# Patient Record
Sex: MALE | Race: BLACK OR AFRICAN AMERICAN | NOT HISPANIC OR LATINO | Employment: FULL TIME | URBAN - METROPOLITAN AREA
[De-identification: names, ages, dates, MRNs, and addresses within clinical notes are randomized per-mention and may not be internally consistent; named-entity substitution may affect disease eponyms.]

---

## 2018-09-26 ENCOUNTER — OFFICE VISIT (OUTPATIENT)
Dept: INTERNAL MEDICINE | Facility: CLINIC | Age: 58
End: 2018-09-26
Payer: COMMERCIAL

## 2018-09-26 ENCOUNTER — HOSPITAL ENCOUNTER (OUTPATIENT)
Dept: RADIOLOGY | Facility: HOSPITAL | Age: 58
Discharge: HOME | End: 2018-09-26
Attending: INTERNAL MEDICINE
Payer: COMMERCIAL

## 2018-09-26 VITALS
HEIGHT: 71 IN | WEIGHT: 264.56 LBS | HEART RATE: 60 BPM | BODY MASS INDEX: 37.04 KG/M2 | DIASTOLIC BLOOD PRESSURE: 70 MMHG | SYSTOLIC BLOOD PRESSURE: 121 MMHG | OXYGEN SATURATION: 97 %

## 2018-09-26 DIAGNOSIS — Z11.59 NEED FOR HEPATITIS C SCREENING TEST: ICD-10-CM

## 2018-09-26 DIAGNOSIS — Z23 NEED FOR TDAP VACCINATION: ICD-10-CM

## 2018-09-26 DIAGNOSIS — M25.562 ACUTE PAIN OF LEFT KNEE: ICD-10-CM

## 2018-09-26 DIAGNOSIS — M25.562 ACUTE PAIN OF LEFT KNEE: Primary | ICD-10-CM

## 2018-09-26 DIAGNOSIS — Z12.5 PROSTATE CANCER SCREENING: ICD-10-CM

## 2018-09-26 DIAGNOSIS — Z12.11 COLON CANCER SCREENING: ICD-10-CM

## 2018-09-26 DIAGNOSIS — Z23 NEEDS FLU SHOT: ICD-10-CM

## 2018-09-26 DIAGNOSIS — Z00.00 PREVENTATIVE HEALTH CARE: ICD-10-CM

## 2018-09-26 PROCEDURE — 99214 OFFICE O/P EST MOD 30 MIN: CPT | Mod: 25,S$GLB,, | Performed by: INTERNAL MEDICINE

## 2018-09-26 PROCEDURE — 90471 IMMUNIZATION ADMIN: CPT | Mod: S$GLB,,, | Performed by: INTERNAL MEDICINE

## 2018-09-26 PROCEDURE — 73560 X-RAY EXAM OF KNEE 1 OR 2: CPT | Mod: 26,LT,, | Performed by: RADIOLOGY

## 2018-09-26 PROCEDURE — 99999 PR PBB SHADOW E&M-EST. PATIENT-LVL III: CPT | Mod: PBBFAC,,, | Performed by: INTERNAL MEDICINE

## 2018-09-26 PROCEDURE — 90472 IMMUNIZATION ADMIN EACH ADD: CPT | Mod: S$GLB,,, | Performed by: INTERNAL MEDICINE

## 2018-09-26 PROCEDURE — 90686 IIV4 VACC NO PRSV 0.5 ML IM: CPT | Mod: S$GLB,,, | Performed by: INTERNAL MEDICINE

## 2018-09-26 PROCEDURE — 3008F BODY MASS INDEX DOCD: CPT | Mod: CPTII,S$GLB,, | Performed by: INTERNAL MEDICINE

## 2018-09-26 PROCEDURE — 90715 TDAP VACCINE 7 YRS/> IM: CPT | Mod: S$GLB,,, | Performed by: INTERNAL MEDICINE

## 2018-09-26 PROCEDURE — 73560 X-RAY EXAM OF KNEE 1 OR 2: CPT | Mod: TC,FY,PO,LT

## 2018-09-26 RX ORDER — ETODOLAC 400 MG/1
400 TABLET, EXTENDED RELEASE ORAL DAILY PRN
Qty: 30 TABLET | Refills: 1 | Status: SHIPPED | OUTPATIENT
Start: 2018-09-26 | End: 2020-02-17

## 2018-09-26 RX ORDER — TRAMADOL HYDROCHLORIDE 50 MG/1
50 TABLET ORAL 3 TIMES DAILY PRN
Qty: 21 TABLET | Refills: 0 | Status: SHIPPED | OUTPATIENT
Start: 2018-09-26 | End: 2018-10-06

## 2018-09-26 NOTE — LETTER
September 26, 2018    Jonh Richards  [address] [place] [address]         [facility]  [address]  [place] [address]  Phone: [phone]  Fax: [phone] September 26, 2018     Patient: Jonh Richards   YOB: 1960   Date of Visit: 9/26/2018       To Whom It May Concern:    It is my medical opinion that Jonh Richards should be excused from work from 9/26/18 to 9/28/18. If you have any questions or concerns, please don't hesitate to call.    Sincerely,        Travis Terrazas MD

## 2018-09-26 NOTE — PROGRESS NOTES
Pt. ID: Jonh Richards is a 58 y.o. male      Chief complaint:   Chief Complaint   Patient presents with    Knee Pain     left       HPI: Pt. Here for L knee pain for past 4 days; he has been lost to f/u since 2016 and I explained risks of non-compliance with f/u;  he denies injury; pain is worse with ROM and walking; he took OTC ibuprofen which helps to some extent; he walks at work and missed work for past 2 days; he states pain is 10/10; he will come back fasting for labs; he would like flu shot and colonoscopy; he would also like tetanus shot; he has gained a few pounds      Review of Systems   Constitutional: Negative for chills and fever.   Respiratory: Negative for cough and shortness of breath.    Cardiovascular: Negative for chest pain and leg swelling.   Genitourinary: Negative for dysuria.   Musculoskeletal: Positive for joint pain.        L knee pain which is worse with ROM and decreased ability to fully flex L knee          Objective:    Physical Exam   Constitutional: He is oriented to person, place, and time.   obese   Eyes: EOM are normal.   Neck: Normal range of motion.   Cardiovascular: Normal rate, regular rhythm and normal heart sounds.   Pulmonary/Chest: Effort normal and breath sounds normal.   Abdominal: Soft. There is no tenderness. There is no rebound and no guarding.   Musculoskeletal: He exhibits tenderness.   Anterior L knee pain with ROM; tenderness to anterior knee; decreased ability to fully flex L knee   Neurological: He is alert and oriented to person, place, and time.   Skin: No rash noted.   Vitals reviewed.        Health Maintenance   Topic Date Due    Hepatitis C Screening  1960    Colonoscopy  02/04/2011    Lipid Panel  11/22/2021    TETANUS VACCINE  09/26/2028    Influenza Vaccine  Completed         Assessment:     1. Acute pain of left knee Active   2. Preventative health care Active   3. Prostate cancer screening Active   4. Needs flu shot Active   5. Colon  cancer screening Active   6. Need for Tdap vaccination Active   7. Need for hepatitis C screening test Active   8. Class 2 obesity with serious comorbidity and body mass index (BMI) of 36.0 to 36.9 in adult, unspecified obesity type Sub-optimally controlled         Plan: Acute pain of left knee  Comments:  start lodine prn and tramadol prn; get L knee xray and re-evaluate in 3 weeks   Orders:  -     X-Ray Knee 1 or 2 View Left; Future; Expected date: 09/26/2018  -     etodolac (LODINE XL) 400 MG 24 hr tablet; Take 1 tablet (400 mg total) by mouth daily as needed (avoid all other NSAIDs).  Dispense: 30 tablet; Refill: 1  -     traMADol (ULTRAM) 50 mg tablet; Take 1 tablet (50 mg total) by mouth 3 (three) times daily as needed for Pain.  Dispense: 21 tablet; Refill: 0    Preventative health care  -     CBC auto differential; Future; Expected date: 09/26/2018  -     Comprehensive metabolic panel; Future; Expected date: 09/26/2018  -     Lipid panel; Future; Expected date: 09/26/2018  -     TSH; Future; Expected date: 09/26/2018    Prostate cancer screening  -     PSA, Screening; Future; Expected date: 09/26/2018    Needs flu shot  -     Influenza - Quadrivalent (3 years & older) (PF)    Colon cancer screening  -     Case request GI: COLONOSCOPY    Need for Tdap vaccination  -     (In Office Administered) Tdap Vaccine    Need for hepatitis C screening test  -     Hepatitis C antibody; Future; Expected date: 09/26/2018    Class 2 obesity with serious comorbidity and body mass index (BMI) of 36.0 to 36.9 in adult, unspecified obesity type  Comments:  encouraged diet and explained risks         Problem List Items Addressed This Visit        Renal/    Prostate cancer screening    Relevant Orders    PSA, Screening       ID    Need for hepatitis C screening test    Relevant Orders    Hepatitis C antibody    Need for Tdap vaccination    Relevant Orders    (In Office Administered) Tdap Vaccine    Needs flu shot    Relevant  Orders    Influenza - Quadrivalent (3 years & older) (PF)       Endocrine    Class 2 obesity with serious comorbidity and body mass index (BMI) of 36.0 to 36.9 in adult       Orthopedic    Acute pain of left knee - Primary    Relevant Medications    etodolac (LODINE XL) 400 MG 24 hr tablet    traMADol (ULTRAM) 50 mg tablet    Other Relevant Orders    X-Ray Knee 1 or 2 View Left       Other    Preventative health care    Relevant Orders    CBC auto differential    Comprehensive metabolic panel    Lipid panel    TSH

## 2018-10-03 ENCOUNTER — TELEPHONE (OUTPATIENT)
Dept: GASTROENTEROLOGY | Facility: CLINIC | Age: 58
End: 2018-10-03

## 2018-10-03 NOTE — TELEPHONE ENCOUNTER
Colonoscopy Referral   Referring Physician: Dr. Terrazas  Date: 11/02/2018  Reason for Referral: Colon Screening  Family History of: None  Colon polyp:  None  Relationship/Age of Onset: None  Colon cancer: None  Relationship/Age of Onset: None    Hemoccults Done:NO    Iron deficient:NO   On Blood Thinner: NO  Valvular heart disease/valve replacement: NO   Anemia Present:NO   On NSAID: NO  Lung disease: NO  Kidney disease:NO   Hx of polyps:None   Hx of colon cancer: None  Previous colon evalations: NO      When:   Where:   Pertinent symptoms:     Patient was scheduled for colonoscopy on 11/02/2018 with Dr. Celestin at [facility]. Suprep instructions were reviewed with patient.

## 2018-10-25 ENCOUNTER — PATIENT MESSAGE (OUTPATIENT)
Dept: FAMILY MEDICINE | Facility: CLINIC | Age: 58
End: 2018-10-25

## 2018-10-30 ENCOUNTER — TELEPHONE (OUTPATIENT)
Dept: GASTROENTEROLOGY | Facility: CLINIC | Age: 58
End: 2018-10-30

## 2018-10-31 ENCOUNTER — TELEPHONE (OUTPATIENT)
Dept: ENDOSCOPY | Facility: HOSPITAL | Age: 58
End: 2018-10-31

## 2018-10-31 NOTE — TELEPHONE ENCOUNTER
Left message instructing patient to call dept @ [phone] between 8am-4pm.    Arrival time given @ 1230p.

## 2018-11-01 ENCOUNTER — TELEPHONE (OUTPATIENT)
Dept: ENDOSCOPY | Facility: HOSPITAL | Age: 58
End: 2018-11-01

## 2018-11-01 NOTE — TELEPHONE ENCOUNTER
Spoke with patient about arrival time @ 1230.     Prep instructions reviewed: the day before the procedure, follow a clear liquid diet all day, then start the first 1/2 of prep at 5pm and take 2nd 1/2 of prep @ 0630.  Pt must be completely NPO when prep completed @ 0930 .    Medications: Do not take Insulin or oral diabetic medications the day of the procedure.  Take as prescribed: heart, seizure and blood pressure medication in the morning with a sip of water (less than an ounce).  Take any breathing medications and bring inhalers to hospital with you Leave all valuables and jewelry at home.     Wear comfortable clothes to procedure to change into hospital gown You cannot drive for 24 hours after your procedure because you will receive sedation for your procedure to make you comfortable.  A ride must be provided at discharge.

## 2018-11-02 ENCOUNTER — ANESTHESIA (OUTPATIENT)
Dept: ENDOSCOPY | Facility: HOSPITAL | Age: 58
End: 2018-11-02
Payer: COMMERCIAL

## 2018-11-02 ENCOUNTER — ANESTHESIA EVENT (OUTPATIENT)
Dept: ENDOSCOPY | Facility: HOSPITAL | Age: 58
End: 2018-11-02
Payer: COMMERCIAL

## 2018-11-02 ENCOUNTER — HOSPITAL ENCOUNTER (OUTPATIENT)
Facility: HOSPITAL | Age: 58
Discharge: HOME | End: 2018-11-02
Attending: INTERNAL MEDICINE | Admitting: INTERNAL MEDICINE
Payer: COMMERCIAL

## 2018-11-02 DIAGNOSIS — Z12.11 SCREENING FOR MALIGNANT NEOPLASM OF COLON: Primary | ICD-10-CM

## 2018-11-02 PROCEDURE — 37000009 HC ANESTHESIA EA ADD 15 MINS: Performed by: INTERNAL MEDICINE

## 2018-11-02 PROCEDURE — 63600175 PHARM REV CODE 636 W HCPCS: Performed by: NURSE ANESTHETIST, CERTIFIED REGISTERED

## 2018-11-02 PROCEDURE — 25000003 PHARM REV CODE 250: Performed by: INTERNAL MEDICINE

## 2018-11-02 PROCEDURE — 45380 COLONOSCOPY AND BIOPSY: CPT | Performed by: INTERNAL MEDICINE

## 2018-11-02 PROCEDURE — 88305 TISSUE EXAM BY PATHOLOGIST: CPT | Mod: 26,,, | Performed by: PATHOLOGY

## 2018-11-02 PROCEDURE — 88305 TISSUE EXAM BY PATHOLOGIST: CPT | Performed by: PATHOLOGY

## 2018-11-02 PROCEDURE — 45380 COLONOSCOPY AND BIOPSY: CPT | Mod: 33,,, | Performed by: INTERNAL MEDICINE

## 2018-11-02 PROCEDURE — 37000008 HC ANESTHESIA 1ST 15 MINUTES: Performed by: INTERNAL MEDICINE

## 2018-11-02 PROCEDURE — 27201012 HC FORCEPS, HOT/COLD, DISP: Performed by: INTERNAL MEDICINE

## 2018-11-02 RX ORDER — PROPOFOL 10 MG/ML
VIAL (ML) INTRAVENOUS
Status: DISCONTINUED | OUTPATIENT
Start: 2018-11-02 | End: 2018-11-02

## 2018-11-02 RX ORDER — SODIUM CHLORIDE 0.9 % (FLUSH) 0.9 %
3 SYRINGE (ML) INJECTION
Status: DISCONTINUED | OUTPATIENT
Start: 2018-11-02 | End: 2018-11-02 | Stop reason: HOSPADM

## 2018-11-02 RX ORDER — PROPOFOL 10 MG/ML
VIAL (ML) INTRAVENOUS CONTINUOUS PRN
Status: DISCONTINUED | OUTPATIENT
Start: 2018-11-02 | End: 2018-11-02

## 2018-11-02 RX ORDER — LIDOCAINE HCL/PF 100 MG/5ML
SYRINGE (ML) INTRAVENOUS
Status: DISCONTINUED | OUTPATIENT
Start: 2018-11-02 | End: 2018-11-02

## 2018-11-02 RX ORDER — SODIUM CHLORIDE 9 MG/ML
INJECTION, SOLUTION INTRAVENOUS CONTINUOUS
Status: DISCONTINUED | OUTPATIENT
Start: 2018-11-02 | End: 2018-11-02 | Stop reason: HOSPADM

## 2018-11-02 RX ADMIN — LIDOCAINE HYDROCHLORIDE 60 MG: 20 INJECTION, SOLUTION INTRAVENOUS at 02:11

## 2018-11-02 RX ADMIN — PROPOFOL 150 MCG/KG/MIN: 10 INJECTION, EMULSION INTRAVENOUS at 02:11

## 2018-11-02 RX ADMIN — SODIUM CHLORIDE: 0.9 INJECTION, SOLUTION INTRAVENOUS at 02:11

## 2018-11-02 RX ADMIN — PROPOFOL 100 MG: 10 INJECTION, EMULSION INTRAVENOUS at 02:11

## 2018-11-02 NOTE — [employer]ATION PATIENT INSTRUCTIONS
Discharge Summary/Instructions after an Endoscopic Procedure  Patient Name: Jonh Richards  Patient MRN: [id number]  Patient YOB: 1960 Friday, November 02, 2018  Mariposa Celestin MD  RESTRICTIONS:  During your procedure today, you received medications for sedation.  These   medications may affect your judgment, balance and coordination.  Therefore,   for 24 hours, you have the following restrictions:   - DO NOT drive a car, operate machinery, make legal/financial decisions,   sign important papers or drink alcohol.    ACTIVITY:  Today: no heavy lifting, straining or running due to procedural   sedation/anesthesia.  The following day: return to full activity including work.  DIET:  Eat and drink normally unless instructed otherwise.     TREATMENT FOR COMMON SIDE EFFECTS:  - Mild abdominal pain, nausea, belching, bloating or excessive gas:  rest,   eat lightly and use a heating pad.  - Sore Throat: treat with throat lozenges and/or gargle with warm salt   water.  - Because air was used during the procedure, expelling large amounts of air   from your rectum or belching is normal.  - If a bowel prep was taken, you may not have a bowel movement for 1-3 days.    This is normal.  SYMPTOMS TO WATCH FOR AND REPORT TO YOUR PHYSICIAN:  1. Abdominal pain or bloating, other than gas cramps.  2. Chest pain.  3. Back pain.  4. Signs of infection such as: chills or fever occurring within 24 hours   after the procedure.  5. Rectal bleeding, which would show as bright red, maroon, or black stools.   (A tablespoon of blood from the rectum is not serious, especially if   hemorrhoids are present.)  6. Vomiting.  7. Weakness or dizziness.  GO DIRECTLY TO THE NEAREST EMERGENCY ROOM IF YOU HAVE ANY OF THE FOLLOWING:      Difficulty breathing              Chills and/or fever over 101 F   Persistent vomiting and/or vomiting blood   Severe abdominal pain   Severe chest pain   Black, tarry stools   Bleeding- more than one  tablespoon   Any other symptom or condition that you feel may need urgent attention  Your doctor recommends these additional instructions:  If any biopsies were taken, your doctors clinic will contact you in 1 to 2   weeks with any results.  - Discharge patient to home (via wheelchair).   - Patient has a contact number available for emergencies.  The signs and   symptoms of potential delayed complications were discussed with the   patient.  Return to normal activities tomorrow.  Written discharge   instructions were provided to the patient.   - Resume previous diet.   - Continue present medications.   - Await pathology results.   - Repeat colonoscopy date to be determined after pending pathology results   are reviewed for surveillance.  For questions, problems or results please call your physician - Mariposa Celestin MD at Work:  [phone].  EMERGENCY PHONE NUMBER: [phone],  LAB RESULTS: [phone]  IF A COMPLICATION OR EMERGENCY SITUATION ARISES AND YOU ARE UNABLE TO REACH   YOUR PHYSICIAN - GO DIRECTLY TO THE EMERGENCY ROOM.  Mariposa Celestin MD  11/2/2018 2:44:15 PM  This report has been verified and signed electronically.  [employer]

## 2018-11-02 NOTE — ANESTHESIA PREPROCEDURE EVALUATION
11/02/2018  Jonh Richards is a 58 y.o., male for colonoscopy under MAC    Past Medical History:   Diagnosis Date    Hyperglycemia          Anesthesia Evaluation    I have reviewed the Patient Summary Reports.    I have reviewed the Nursing Notes.   I have reviewed the Medications.     Review of Systems  Social:  Non-Smoker, No Alcohol Use    Cardiovascular:   Hypertension    Neurological:   Headaches        Physical Exam  General:  Well nourished, Obesity    Airway/Jaw/Neck:  Airway Findings: Mallampati: II      Chest/Lungs:  Chest/Lungs Clear    Heart/Vascular:  Heart Findings: Normal        CONCLUSIONS     1 - Normal left ventricular systolic function (EF 60-65%).     2 - Normal right ventricular systolic function .     No evidence of stress induced myocardial ischemia.         This document has been electronically    SIGNED BY: Palmira Mari MD On: 12/05/2016 14:34    Anesthesia Plan  Type of Anesthesia, risks & benefits discussed:  Anesthesia Type:  MAC  Patient's Preference:   Intra-op Monitoring Plan:   Intra-op Monitoring Plan Comments:   Post Op Pain Control Plan:   Post Op Pain Control Plan Comments:   Induction:    Beta Blocker:  Patient is not currently on a Beta-Blocker (No further documentation required).       Informed Consent: Patient understands risks and agrees with Anesthesia plan.  Questions answered. Anesthesia consent signed with patient.  ASA Score: 2     Day of Surgery Review of History & Physical:            Ready For Surgery From Anesthesia Perspective.

## 2018-11-02 NOTE — OR NURSING
Discharge Instructions given to patient. Verbalized understanding. Alert and oriented x4, VSS. Waiting for patient's ride.

## 2018-11-02 NOTE — ANESTHESIA POSTPROCEDURE EVALUATION
"Anesthesia Post Evaluation    Patient: Jonh Richards    Procedure(s) Performed: Procedure(s) (LRB):  COLONOSCOPY/Suprep (N/A)    Final Anesthesia Type: MAC  Patient location during evaluation: GI PACU  Patient participation: Yes- Able to Participate  Level of consciousness: awake and alert and oriented  Post-procedure vital signs: reviewed and stable  Pain management: adequate  Airway patency: patent  PONV status at discharge: No PONV  Anesthetic complications: no      Cardiovascular status: blood pressure returned to baseline and hemodynamically stable  Respiratory status: unassisted, room air and spontaneous ventilation  Hydration status: euvolemic  Follow-up not needed.        Visit Vitals  /82 (BP Location: Left arm, Patient Position: Lying)   Pulse (!) 56   Temp 36.6 °C (97.9 °F) (Skin)   Resp 18   Ht 5' 11" (1.803 m)   Wt 115.7 kg (255 lb)   SpO2 99%   BMI 35.57 kg/m²       Pain/Kim Score: Pain Assessment Performed: Yes (11/2/2018  1:03 PM)  Presence of Pain: denies (11/2/2018  1:03 PM)        "

## 2018-11-02 NOTE — H&P
[facility]  Gastroenterology  H&P    Patient Name: Jonh Richards  MRN: [id number]  Admission Date: 11/2/2018  Code Status: No Order    Attending Provider: Mariposa Celestin MD   Primary Care Physician: Travis Terrazas MD  Principal Problem:<principal problem not specified>    Subjective:     History of Present Illness: Screening for colon cancer    Past Medical History:   Diagnosis Date    Hyperglycemia     pre diabetes  , diet controlled.  never on meds       Past Surgical History:   Procedure Laterality Date    none         Review of patient's allergies indicates:  No Known Allergies  Family History     Problem Relation (Age of Onset)    Heart disease Father        Tobacco Use    Smoking status: Never Smoker    Smokeless tobacco: Never Used   Substance and Sexual Activity    Alcohol use: No    Drug use: Not on file    Sexual activity: Not on file     Review of Systems   Constitutional: Negative for appetite change and unexpected weight change.   Cardiovascular: Negative for chest pain and palpitations.   Gastrointestinal: Negative for abdominal distention and abdominal pain.     Objective:     Vital Signs (Most Recent):  Temp: 97.9 °F (36.6 °C) (11/02/18 1256)  Pulse: (!) 56 (11/02/18 1256)  Resp: 18 (11/02/18 1256)  BP: 135/82 (11/02/18 1256)  SpO2: 99 % (11/02/18 1256) Vital Signs (24h Range):  Temp:  [97.9 °F (36.6 °C)] 97.9 °F (36.6 °C)  Pulse:  [56] 56  Resp:  [18] 18  SpO2:  [99 %] 99 %  BP: (135)/(82) 135/82     Weight: 115.7 kg (255 lb) (11/02/18 1256)  Body mass index is 35.57 kg/m².    No intake or output data in the 24 hours ending 11/02/18 1352    Lines/Drains/Airways     Peripheral Intravenous Line                 Peripheral IV - Single Lumen 11/02/18 1314 Right Hand less than 1 day                Physical Exam   Constitutional: He is oriented to person, place, and time. He appears well-developed and well-nourished. No distress.   HENT:   Head: Normocephalic.   Eyes:  Conjunctivae are normal. No scleral icterus.   Neck: No tracheal deviation present. No thyromegaly present.   Cardiovascular: Normal rate, regular rhythm and normal heart sounds. Exam reveals no gallop and no friction rub.   No murmur heard.  Pulmonary/Chest: Effort normal and breath sounds normal. He has no wheezes. He has no rales.   Abdominal: Soft. Bowel sounds are normal. He exhibits no distension. There is no tenderness. There is no rebound and no guarding.   Musculoskeletal: Normal range of motion. He exhibits no edema or tenderness.   Neurological: He is alert and oriented to person, place, and time.   Skin: He is not diaphoretic.   Psychiatric: He has a normal mood and affect. His behavior is normal.           Assessment/Plan:   - Colon cancer screening    Plan  1. Colonoscopy    Mariposa Celestin MD  Gastroenterology  [facility]

## 2018-11-02 NOTE — TRANSFER OF CARE
"Anesthesia Transfer of Care Note    Patient: Jonh Richards    Procedure(s) Performed: Procedure(s) (LRB):  COLONOSCOPY/Suprep (N/A)    Patient location: GI    Anesthesia Type: MAC    Transport from OR: Transported from OR on room air with adequate spontaneous ventilation    Post pain: adequate analgesia    Post assessment: no apparent anesthetic complications and tolerated procedure well    Post vital signs: stable    Level of consciousness: awake, alert and oriented    Nausea/Vomiting: no nausea/vomiting    Complications: none    Transfer of care protocol was followed      Last vitals:   Visit Vitals  /82 (BP Location: Left arm, Patient Position: Lying)   Pulse (!) 56   Temp 36.6 °C (97.9 °F) (Skin)   Resp 18   Ht 5' 11" (1.803 m)   Wt 115.7 kg (255 lb)   SpO2 99%   BMI 35.57 kg/m²     "

## 2018-11-05 VITALS
BODY MASS INDEX: 35.7 KG/M2 | OXYGEN SATURATION: 98 % | HEART RATE: 58 BPM | TEMPERATURE: 98 F | WEIGHT: 255 LBS | SYSTOLIC BLOOD PRESSURE: 143 MMHG | DIASTOLIC BLOOD PRESSURE: 93 MMHG | HEIGHT: 71 IN | RESPIRATION RATE: 18 BRPM

## 2019-06-13 ENCOUNTER — TELEPHONE (OUTPATIENT)
Dept: FAMILY MEDICINE | Facility: CLINIC | Age: 59
End: 2019-06-13

## 2019-06-13 NOTE — TELEPHONE ENCOUNTER
Looks like patient is just coming in to get a referral for a vasectomy - so can leave appointment as is.  I will see him for vasectomy referral and then have him return for fasting labs and WELLNESS at a later date.

## 2019-06-13 NOTE — TELEPHONE ENCOUNTER
Patient coming in to establish care. Patient's chart was not reviewed, please advise if patient can still be seen or if patient needs lab work.

## 2019-12-13 NOTE — TELEPHONE ENCOUNTER
----- Message from Magui Mann sent at 10/30/2018  3:00 PM CDT -----  Contact: PT  Pt called asking for advice about his procedure    Pt can be reached at [phone]    
school

## 2020-02-15 NOTE — PROGRESS NOTES
"Subjective:       Patient ID: Jonh Richards is a 60 y.o. male.    Chief Complaint:   Establish Care      HPI    #Last visit/Previous Provider  This patient is new to me  Previously seen  By Dr landaverde  Last visit was 2018   Last CPE was 2018       Depression/Anxiety  On  2019 patient had event a work. 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 He was call into his supervisors officer. He reports his supervisor said to him in the office - after one of his workers made a mistake - that " he need to control his monkey or he'll fire him"  Every since then he has had trouble dealing with his job. After first he was having lots of stress and anxiety. He reports now he is not sleeping, and we he does sleep he is dreaming about his  parents. He reports low fatigue and anhedonia. He was recently but on 2 weeks without pay. He state he never had issues previously with anxiety or depression and has never taken medication for symptoms. Apart from discussing with family members or friends he has not had psychology or psychiatry now or in the past.         Health Maintenance   Topic Date Due    Hepatitis C Screening  1960    Lipid Panel  2021    TETANUS VACCINE  2028    Colonoscopy  2028         Review of Systems   Constitutional: Positive for fatigue. Negative for activity change, appetite change and fever.   Respiratory: Negative for shortness of breath.    Cardiovascular: Negative for chest pain.   Gastrointestinal: Negative for abdominal pain.   Genitourinary: Negative for difficulty urinating.   Neurological: Negative for syncope and headaches.   Psychiatric/Behavioral: Positive for dysphoric mood. The patient is nervous/anxious.        Objective:   /68 (BP Location: Right arm, Patient Position: Sitting, BP Method: Medium (Manual))   Pulse 64   Ht 5' 11" (1.803 m)   Wt 115 kg (253 lb 8.5 oz)   SpO2 98%   BMI 35.36 kg/m²        Physical Exam   Constitutional: He " appears well-developed and well-nourished. No distress.   HENT:   Head: Normocephalic.   Mouth/Throat: Oropharynx is clear and moist. No oropharyngeal exudate.   Eyes: Pupils are equal, round, and reactive to light. Conjunctivae are normal. Right eye exhibits no discharge. Left eye exhibits no discharge.   Cardiovascular: Normal rate, regular rhythm and normal heart sounds. Exam reveals no gallop and no friction rub.   No murmur heard.  Pulmonary/Chest: Effort normal. No stridor. No respiratory distress. He has no wheezes. He has no rales. He exhibits no tenderness.   Abdominal: Soft. He exhibits no distension.   Neurological: He is alert.   Skin: Skin is warm. He is not diaphoretic.   Psychiatric: He has a normal mood and affect.   Nursing note and vitals reviewed.            Basic Labs  BMP  Lab Results   Component Value Date     11/22/2016    K 4.6 11/22/2016     11/22/2016    CO2 28 11/22/2016    BUN 17 11/22/2016    CREATININE 1.2 11/22/2016    CALCIUM 9.4 11/22/2016    ANIONGAP 6 (L) 11/22/2016    ESTGFRAFRICA >60.0 11/22/2016    EGFRNONAA >60.0 11/22/2016     Lab Results   Component Value Date    ALT 20 11/22/2016    AST 16 11/22/2016    ALKPHOS 43 (L) 11/22/2016    BILITOT 0.8 11/22/2016       Lab Results   Component Value Date    TSH 0.608 11/22/2016           Lipids  Lab Results   Component Value Date    CHOL 186 11/22/2016     Lab Results   Component Value Date    HDL 49 11/22/2016     Lab Results   Component Value Date    LDLCALC 126.4 11/22/2016     Lab Results   Component Value Date    TRIG 53 11/22/2016     Lab Results   Component Value Date    CHOLHDL 26.3 11/22/2016       DM  Lab Results   Component Value Date    HGBA1C 6.1 08/08/2014       PSA  PSA, SCREEN (ng/mL)   Date Value   11/22/2016 2.2       Assessment:       1. Depression, unspecified depression type    2. Acute stress disorder    3. Screening for diabetes mellitus    4. Prediabetes    5. Screening for hyperlipidemia    6.  Encounter for hepatitis C screening test for low risk patient            Plan:             Jonh was seen today for establish care.    Diagnoses and all orders for this visit:    Depression, unspecified depression typeNew: Uncontrolled  Given the duration of symptoms suspect he may have crossed over from acute stress reaction to anxiety and depression  Will trial sertraline 25 mg daily    He will return in 2 weeks for follow up    I spent at least 45 mins face-to-face with the patient. More that 50% of the that time was spent counseling the patient on there diagnosis, treatment and management and answering questions.   -     sertraline (ZOLOFT) 25 MG tablet; Take 1 tablet (25 mg total) by mouth once daily.    Prediabetes  -     Comprehensive metabolic panel; Standing  -     TSH; Standing  -     Hemoglobin A1c; Standing    Screening for hyperlipidemia  -     Lipid panel; Standing    Encounter for hepatitis C screening test for low risk patient  -     Hepatitis C antibody; Standing

## 2020-02-17 ENCOUNTER — OFFICE VISIT (OUTPATIENT)
Dept: INTERNAL MEDICINE | Facility: CLINIC | Age: 60
End: 2020-02-17
Payer: COMMERCIAL

## 2020-02-17 VITALS
HEIGHT: 71 IN | WEIGHT: 253.5 LBS | HEART RATE: 64 BPM | SYSTOLIC BLOOD PRESSURE: 130 MMHG | BODY MASS INDEX: 35.49 KG/M2 | OXYGEN SATURATION: 98 % | DIASTOLIC BLOOD PRESSURE: 68 MMHG

## 2020-02-17 DIAGNOSIS — F43.0 ACUTE STRESS DISORDER: ICD-10-CM

## 2020-02-17 DIAGNOSIS — Z13.220 SCREENING FOR HYPERLIPIDEMIA: ICD-10-CM

## 2020-02-17 DIAGNOSIS — Z11.3 SCREENING FOR VENEREAL DISEASE: ICD-10-CM

## 2020-02-17 DIAGNOSIS — F32.A DEPRESSION, UNSPECIFIED DEPRESSION TYPE: Primary | ICD-10-CM

## 2020-02-17 DIAGNOSIS — Z11.59 ENCOUNTER FOR HEPATITIS C SCREENING TEST FOR LOW RISK PATIENT: ICD-10-CM

## 2020-02-17 DIAGNOSIS — R73.03 PREDIABETES: ICD-10-CM

## 2020-02-17 DIAGNOSIS — Z13.1 SCREENING FOR DIABETES MELLITUS: ICD-10-CM

## 2020-02-17 PROCEDURE — 99999 PR PBB SHADOW E&M-EST. PATIENT-LVL IV: CPT | Mod: PBBFAC,,, | Performed by: INTERNAL MEDICINE

## 2020-02-17 PROCEDURE — 99999 PR PBB SHADOW E&M-EST. PATIENT-LVL IV: ICD-10-PCS | Mod: PBBFAC,,, | Performed by: INTERNAL MEDICINE

## 2020-02-17 PROCEDURE — 3008F BODY MASS INDEX DOCD: CPT | Mod: CPTII,S$GLB,, | Performed by: INTERNAL MEDICINE

## 2020-02-17 PROCEDURE — 3008F PR BODY MASS INDEX (BMI) DOCUMENTED: ICD-10-PCS | Mod: CPTII,S$GLB,, | Performed by: INTERNAL MEDICINE

## 2020-02-17 PROCEDURE — 99214 OFFICE O/P EST MOD 30 MIN: CPT | Mod: S$GLB,,, | Performed by: INTERNAL MEDICINE

## 2020-02-17 PROCEDURE — 99214 PR OFFICE/OUTPT VISIT, EST, LEVL IV, 30-39 MIN: ICD-10-PCS | Mod: S$GLB,,, | Performed by: INTERNAL MEDICINE

## 2020-02-17 RX ORDER — SERTRALINE HYDROCHLORIDE 25 MG/1
25 TABLET, FILM COATED ORAL DAILY
Qty: 30 TABLET | Refills: 11 | Status: SHIPPED | OUTPATIENT
Start: 2020-02-17 | End: 2020-08-12 | Stop reason: SDUPTHER

## 2020-02-17 NOTE — PATIENT INSTRUCTIONS
We were happy to see you today      For your Medication   We started you on the medication please sertaline 25 mg daily   For more information about side effects please visit medlineplus.gov      For your Referrals    See below         Please return to clinic in  2 weeks       Extra resources     1. [employer]  2. [facility] [phone]  3. Meditation apps Headspace and insight timer

## 2020-02-28 ENCOUNTER — LAB VISIT (OUTPATIENT)
Dept: LAB | Facility: HOSPITAL | Age: 60
End: 2020-02-28
Attending: INTERNAL MEDICINE
Payer: COMMERCIAL

## 2020-02-28 DIAGNOSIS — Z11.59 ENCOUNTER FOR HEPATITIS C SCREENING TEST FOR LOW RISK PATIENT: ICD-10-CM

## 2020-02-28 DIAGNOSIS — Z13.220 SCREENING FOR HYPERLIPIDEMIA: ICD-10-CM

## 2020-02-28 DIAGNOSIS — Z11.3 SCREENING FOR VENEREAL DISEASE: ICD-10-CM

## 2020-02-28 DIAGNOSIS — R73.03 PREDIABETES: ICD-10-CM

## 2020-02-28 LAB
ALBUMIN SERPL BCP-MCNC: 3.7 G/DL (ref 3.5–5.2)
ALP SERPL-CCNC: 46 U/L (ref 55–135)
ALT SERPL W/O P-5'-P-CCNC: 22 U/L (ref 10–44)
ANION GAP SERPL CALC-SCNC: 6 MMOL/L (ref 8–16)
AST SERPL-CCNC: 21 U/L (ref 10–40)
BILIRUB SERPL-MCNC: 0.4 MG/DL (ref 0.1–1)
BUN SERPL-MCNC: 20 MG/DL (ref 6–20)
CALCIUM SERPL-MCNC: 9.3 MG/DL (ref 8.7–10.5)
CHLORIDE SERPL-SCNC: 104 MMOL/L (ref 95–110)
CHOLEST SERPL-MCNC: 173 MG/DL (ref 120–199)
CHOLEST/HDLC SERPL: 3.2 {RATIO} (ref 2–5)
CO2 SERPL-SCNC: 29 MMOL/L (ref 23–29)
CREAT SERPL-MCNC: 1.3 MG/DL (ref 0.5–1.4)
EST. GFR  (AFRICAN AMERICAN): >60 ML/MIN/1.73 M^2
EST. GFR  (NON AFRICAN AMERICAN): 59.7 ML/MIN/1.73 M^2
ESTIMATED AVG GLUCOSE: 134 MG/DL (ref 68–131)
GLUCOSE SERPL-MCNC: 128 MG/DL (ref 70–110)
HBA1C MFR BLD HPLC: 6.3 % (ref 4–5.6)
HCV AB SERPL QL IA: NEGATIVE
HDLC SERPL-MCNC: 54 MG/DL (ref 40–75)
HDLC SERPL: 31.2 % (ref 20–50)
LDLC SERPL CALC-MCNC: 110.8 MG/DL (ref 63–159)
NONHDLC SERPL-MCNC: 119 MG/DL
POTASSIUM SERPL-SCNC: 4.4 MMOL/L (ref 3.5–5.1)
PROT SERPL-MCNC: 7.5 G/DL (ref 6–8.4)
SODIUM SERPL-SCNC: 139 MMOL/L (ref 136–145)
TRIGL SERPL-MCNC: 41 MG/DL (ref 30–150)
TSH SERPL DL<=0.005 MIU/L-ACNC: 0.76 UIU/ML (ref 0.4–4)

## 2020-02-28 PROCEDURE — 86703 HIV-1/HIV-2 1 RESULT ANTBDY: CPT

## 2020-02-28 PROCEDURE — 84443 ASSAY THYROID STIM HORMONE: CPT

## 2020-02-28 PROCEDURE — 80053 COMPREHEN METABOLIC PANEL: CPT

## 2020-02-28 PROCEDURE — 83036 HEMOGLOBIN GLYCOSYLATED A1C: CPT

## 2020-02-28 PROCEDURE — 86803 HEPATITIS C AB TEST: CPT

## 2020-02-28 PROCEDURE — 80061 LIPID PANEL: CPT

## 2020-02-28 PROCEDURE — 36415 COLL VENOUS BLD VENIPUNCTURE: CPT | Mod: PO

## 2020-02-29 NOTE — PROGRESS NOTES
"Subjective:       Patient ID: Jonh Richards is a 60 y.o. male.    Chief Complaint:   Anxiety      HPI    #Interim Hx    No urgent care or ED/hospitalization    #Concerns Today    Depression/Anxiety  -- interim hx; feeling better today  -- Provider: psych, therapy has not seen   -- medication: still a bit reluctant to start medication  -- Diet/exercise; poor exercise  -- substance use; none      #Chronic Problems    PreDM 6.1--> 6.3%    Health Maintenance   Topic Date Due    Lipid Panel  02/28/2025    TETANUS VACCINE  09/26/2028    Colonoscopy  11/02/2028    Hepatitis C Screening  Completed     Review of Systems   Constitutional: Negative for activity change, appetite change, fatigue and fever.   Respiratory: Negative for shortness of breath.    Cardiovascular: Negative for chest pain.   Gastrointestinal: Negative for abdominal pain.   Genitourinary: Negative for difficulty urinating.   Neurological: Negative for syncope and headaches.       Objective:   /78 (BP Location: Right arm, Patient Position: Sitting, BP Method: Medium (Manual))   Pulse 64   Ht 5' 11" (1.803 m)   Wt 116 kg (255 lb 11.7 oz)   SpO2 98%   BMI 35.67 kg/m²        Physical Exam   Constitutional: He appears well-developed and well-nourished. No distress.   HENT:   Head: Normocephalic.   Mouth/Throat: Oropharynx is clear and moist. No oropharyngeal exudate.   Eyes: Pupils are equal, round, and reactive to light. Conjunctivae are normal. Right eye exhibits no discharge. Left eye exhibits no discharge.   Cardiovascular: Normal rate, regular rhythm and normal heart sounds. Exam reveals no gallop and no friction rub.   No murmur heard.  Pulmonary/Chest: Effort normal. No stridor. No respiratory distress. He has no wheezes. He has no rales. He exhibits no tenderness.   Abdominal: Soft. He exhibits no distension.   Neurological: He is alert.   Skin: Skin is warm. He is not diaphoretic.   Psychiatric: He has a normal mood and affect. "   Nursing note and vitals reviewed.          ASCVD  The 10-year ASCVD risk score (Boyd AVENDANO Jr., et al., 2013) is: 6.6%    Values used to calculate the score:      Age: 60 years      Sex: Male      Is Non- : Yes      Diabetic: No      Tobacco smoker: No      Systolic Blood Pressure: 120 mmHg      Is BP treated: No      HDL Cholesterol: 54 mg/dL      Total Cholesterol: 173 mg/dL    Basic Labs  BMP  Lab Results   Component Value Date     02/28/2020    K 4.4 02/28/2020     02/28/2020    CO2 29 02/28/2020    BUN 20 02/28/2020    CREATININE 1.3 02/28/2020    CALCIUM 9.3 02/28/2020    ANIONGAP 6 (L) 02/28/2020    ESTGFRAFRICA >60.0 02/28/2020    EGFRNONAA 59.7 (A) 02/28/2020     Lab Results   Component Value Date    ALT 22 02/28/2020    AST 21 02/28/2020    ALKPHOS 46 (L) 02/28/2020    BILITOT 0.4 02/28/2020       Lab Results   Component Value Date    TSH 0.764 02/28/2020       STD    Lab Results   Component Value Date    HEPCAB Negative 02/28/2020         Lipids  Lab Results   Component Value Date    CHOL 173 02/28/2020     Lab Results   Component Value Date    HDL 54 02/28/2020     Lab Results   Component Value Date    LDLCALC 110.8 02/28/2020     Lab Results   Component Value Date    TRIG 41 02/28/2020     Lab Results   Component Value Date    CHOLHDL 31.2 02/28/2020       DM  Lab Results   Component Value Date    HGBA1C 6.3 (H) 02/28/2020       PSA  PSA, SCREEN (ng/mL)   Date Value   11/22/2016 2.2       Assessment:       1. Depression, unspecified depression type    2. Prediabetes    3. Class 2 obesity with body mass index (BMI) of 35.0 to 35.9 in adult, unspecified obesity type, unspecified whether serious comorbidity present            Plan:             Jonh was seen today for anxiety.    Diagnoses and all orders for this visit:    Depression, unspecified depression type  -- Patient is not at goal today   -- Labs are reviewed and wnl  -- He was reluctant to start medicaiton  --   return in 3 months for follow up  -    sertraline (ZOLOFT) 25 MG tablet; Take 1 tablet (25 mg total) by mouth once daily.    Prediabetes  -     Hemoglobin A1c; Standing    Class 2 obesity with body mass index (BMI) of 35.0 to 35.9 in adult, unspecified obesity type, unspecified whether serious comorbidity present  I reviewed routine weight management option and recommendation for diet and exercise to include 4-5 servings of fruits and vegetables per day, avoidance of fast food, fried food, sweetend beverages and to exercise 150min/week.

## 2020-03-02 ENCOUNTER — OFFICE VISIT (OUTPATIENT)
Dept: INTERNAL MEDICINE | Facility: CLINIC | Age: 60
End: 2020-03-02
Payer: COMMERCIAL

## 2020-03-02 VITALS
SYSTOLIC BLOOD PRESSURE: 120 MMHG | DIASTOLIC BLOOD PRESSURE: 78 MMHG | BODY MASS INDEX: 35.81 KG/M2 | HEART RATE: 64 BPM | OXYGEN SATURATION: 98 % | HEIGHT: 71 IN | WEIGHT: 255.75 LBS

## 2020-03-02 DIAGNOSIS — R73.03 PREDIABETES: ICD-10-CM

## 2020-03-02 DIAGNOSIS — E66.9 CLASS 2 OBESITY WITH BODY MASS INDEX (BMI) OF 35.0 TO 35.9 IN ADULT, UNSPECIFIED OBESITY TYPE, UNSPECIFIED WHETHER SERIOUS COMORBIDITY PRESENT: ICD-10-CM

## 2020-03-02 DIAGNOSIS — F32.A DEPRESSION, UNSPECIFIED DEPRESSION TYPE: Primary | ICD-10-CM

## 2020-03-02 LAB — HIV 1+2 AB+HIV1 P24 AG SERPL QL IA: NEGATIVE

## 2020-03-02 PROCEDURE — 99214 OFFICE O/P EST MOD 30 MIN: CPT | Mod: S$GLB,,, | Performed by: INTERNAL MEDICINE

## 2020-03-02 PROCEDURE — 3008F PR BODY MASS INDEX (BMI) DOCUMENTED: ICD-10-PCS | Mod: CPTII,S$GLB,, | Performed by: INTERNAL MEDICINE

## 2020-03-02 PROCEDURE — 99999 PR PBB SHADOW E&M-EST. PATIENT-LVL III: CPT | Mod: PBBFAC,,, | Performed by: INTERNAL MEDICINE

## 2020-03-02 PROCEDURE — 3008F BODY MASS INDEX DOCD: CPT | Mod: CPTII,S$GLB,, | Performed by: INTERNAL MEDICINE

## 2020-03-02 PROCEDURE — 99999 PR PBB SHADOW E&M-EST. PATIENT-LVL III: ICD-10-PCS | Mod: PBBFAC,,, | Performed by: INTERNAL MEDICINE

## 2020-03-02 PROCEDURE — 99214 PR OFFICE/OUTPT VISIT, EST, LEVL IV, 30-39 MIN: ICD-10-PCS | Mod: S$GLB,,, | Performed by: INTERNAL MEDICINE

## 2020-03-02 RX ORDER — SERTRALINE HYDROCHLORIDE 25 MG/1
25 TABLET, FILM COATED ORAL DAILY
Qty: 90 TABLET | Refills: 0 | Status: SHIPPED | OUTPATIENT
Start: 2020-03-02 | End: 2020-03-02

## 2020-03-02 NOTE — PATIENT INSTRUCTIONS
We were happy to see you today    For your Testing  Please have your labs and imaging test done at your earliest convience      For your Medication   You can try the melatonin   Often times called SLEEP AID in the pharmacy for sleep   For more information about side effects please visit medlineplus.gov      For your Referrals      For your Vaccinations    We gave your the following vaccines  Please obtain the following vaccines at the pharmacy          Please return to clinic in      3 month     Extra resources     1. [email or web address]  2. [facility] [phone]  3. Meditation apps Headspace and insight timer       Law Radha Cam  [phone]  Please ask for Zeus Carpenter

## 2020-03-02 NOTE — LETTER
March 2, 2020      [facility]  [address]  [place] [address]  Phone: [phone]  Fax: [phone]       Patient: Jonh Richards   YOB: 1960  Date of Visit: 03/02/2020    To Whom It May Concern:    Shady Richards  was at [facility] on 03/02/2020. He may return to work/school on 4/3 with no restrictions. If you have any questions or concerns, or if I can be of further assistance, please do not hesitate to contact me.    Sincerely,    Jericho Chavira III, MD

## 2020-08-12 ENCOUNTER — LAB VISIT (OUTPATIENT)
Dept: LAB | Facility: HOSPITAL | Age: 60
End: 2020-08-12
Attending: INTERNAL MEDICINE
Payer: COMMERCIAL

## 2020-08-12 ENCOUNTER — OFFICE VISIT (OUTPATIENT)
Dept: INTERNAL MEDICINE | Facility: CLINIC | Age: 60
End: 2020-08-12
Payer: COMMERCIAL

## 2020-08-12 VITALS
SYSTOLIC BLOOD PRESSURE: 120 MMHG | DIASTOLIC BLOOD PRESSURE: 60 MMHG | HEIGHT: 71 IN | BODY MASS INDEX: 35.49 KG/M2 | OXYGEN SATURATION: 98 % | WEIGHT: 253.5 LBS | HEART RATE: 61 BPM

## 2020-08-12 DIAGNOSIS — F32.A DEPRESSION, UNSPECIFIED DEPRESSION TYPE: Primary | ICD-10-CM

## 2020-08-12 DIAGNOSIS — R73.03 PREDIABETES: ICD-10-CM

## 2020-08-12 DIAGNOSIS — F43.0 ACUTE STRESS DISORDER: ICD-10-CM

## 2020-08-12 LAB
ESTIMATED AVG GLUCOSE: 137 MG/DL (ref 68–131)
HBA1C MFR BLD HPLC: 6.4 % (ref 4–5.6)

## 2020-08-12 PROCEDURE — 3008F BODY MASS INDEX DOCD: CPT | Mod: CPTII,S$GLB,, | Performed by: INTERNAL MEDICINE

## 2020-08-12 PROCEDURE — 99213 PR OFFICE/OUTPT VISIT, EST, LEVL III, 20-29 MIN: ICD-10-PCS | Mod: S$GLB,,, | Performed by: INTERNAL MEDICINE

## 2020-08-12 PROCEDURE — 3008F PR BODY MASS INDEX (BMI) DOCUMENTED: ICD-10-PCS | Mod: CPTII,S$GLB,, | Performed by: INTERNAL MEDICINE

## 2020-08-12 PROCEDURE — 36415 COLL VENOUS BLD VENIPUNCTURE: CPT | Mod: PO

## 2020-08-12 PROCEDURE — 99999 PR PBB SHADOW E&M-EST. PATIENT-LVL III: ICD-10-PCS | Mod: PBBFAC,,, | Performed by: INTERNAL MEDICINE

## 2020-08-12 PROCEDURE — 99999 PR PBB SHADOW E&M-EST. PATIENT-LVL III: CPT | Mod: PBBFAC,,, | Performed by: INTERNAL MEDICINE

## 2020-08-12 PROCEDURE — 83036 HEMOGLOBIN GLYCOSYLATED A1C: CPT

## 2020-08-12 PROCEDURE — 99213 OFFICE O/P EST LOW 20 MIN: CPT | Mod: S$GLB,,, | Performed by: INTERNAL MEDICINE

## 2020-08-12 RX ORDER — SERTRALINE HYDROCHLORIDE 25 MG/1
25 TABLET, FILM COATED ORAL DAILY
Qty: 90 TABLET | Refills: 0 | Status: SHIPPED | OUTPATIENT
Start: 2020-08-12 | End: 2021-03-08 | Stop reason: SDUPTHER

## 2020-08-12 NOTE — PROGRESS NOTES
"Subjective:       Patient ID: Jonh Richards is a 60 y.o. male.    Chief Complaint:   Follow-up      HPI    #Interim Hx      #Concerns Today    Depression/Anxiety  -- interim hx; feeling stable today   -- Provider: psych, therapy has not seen   -- medication: started sertraline and was feeling better then ran out of the mediation and never called the office for refill  -- Diet/exercise; poor exercise  -- substance use; none      #Chronic Problems    PreDM 6.1--> 6.3%  There are no preventive care reminders to display for this patient.  Health Maintenance Topics with due status: Not Due       Topic Last Completion Date    TETANUS VACCINE 09/26/2018    Colorectal Cancer Screening 11/02/2018    Influenza Vaccine 12/17/2019    Lipid Panel 02/28/2020       Review of Systems   Constitutional: Negative for activity change, appetite change, fatigue and fever.   Respiratory: Negative for shortness of breath.    Cardiovascular: Negative for chest pain.   Gastrointestinal: Negative for abdominal pain.   Genitourinary: Negative for difficulty urinating.   Neurological: Negative for syncope and headaches.       Objective:   /60 (BP Location: Right arm, Patient Position: Sitting, BP Method: Medium (Manual))   Pulse 61   Ht 5' 11" (1.803 m)   Wt 115 kg (253 lb 8.5 oz)   SpO2 98%   BMI 35.36 kg/m²        Physical Exam  Vitals signs and nursing note reviewed.   Constitutional:       General: He is not in acute distress.     Appearance: He is well-developed. He is not diaphoretic.   HENT:      Head: Normocephalic.      Mouth/Throat:      Pharynx: No oropharyngeal exudate.   Eyes:      General:         Right eye: No discharge.         Left eye: No discharge.      Conjunctiva/sclera: Conjunctivae normal.      Pupils: Pupils are equal, round, and reactive to light.   Cardiovascular:      Rate and Rhythm: Normal rate and regular rhythm.      Heart sounds: Normal heart sounds. No murmur. No friction rub. No gallop.  "   Pulmonary:      Effort: Pulmonary effort is normal. No respiratory distress.      Breath sounds: No stridor. No wheezing or rales.   Chest:      Chest wall: No tenderness.   Abdominal:      General: There is no distension.      Palpations: Abdomen is soft.   Skin:     General: Skin is warm.   Neurological:      Mental Status: He is alert.             ASCVD  The 10-year ASCVD risk score (Boyd AVENDANO Jr., et al., 2013) is: 6.6%    Values used to calculate the score:      Age: 60 years      Sex: Male      Is Non- : Yes      Diabetic: No      Tobacco smoker: No      Systolic Blood Pressure: 120 mmHg      Is BP treated: No      HDL Cholesterol: 54 mg/dL      Total Cholesterol: 173 mg/dL    Basic Labs  BMP  Lab Results   Component Value Date     02/28/2020    K 4.4 02/28/2020     02/28/2020    CO2 29 02/28/2020    BUN 20 02/28/2020    CREATININE 1.3 02/28/2020    CALCIUM 9.3 02/28/2020    ANIONGAP 6 (L) 02/28/2020    ESTGFRAFRICA >60.0 02/28/2020    EGFRNONAA 59.7 (A) 02/28/2020     Lab Results   Component Value Date    ALT 22 02/28/2020    AST 21 02/28/2020    ALKPHOS 46 (L) 02/28/2020    BILITOT 0.4 02/28/2020       Lab Results   Component Value Date    TSH 0.764 02/28/2020       STD    Lab Results   Component Value Date    HEPCAB Negative 02/28/2020         Lipids  Lab Results   Component Value Date    CHOL 173 02/28/2020     Lab Results   Component Value Date    HDL 54 02/28/2020     Lab Results   Component Value Date    LDLCALC 110.8 02/28/2020     Lab Results   Component Value Date    TRIG 41 02/28/2020     Lab Results   Component Value Date    CHOLHDL 31.2 02/28/2020       DM  Lab Results   Component Value Date    HGBA1C 6.3 (H) 02/28/2020       PSA  PSA, SCREEN (ng/mL)   Date Value   11/22/2016 2.2       Assessment:       1. Depression, unspecified depression type    2. Acute stress disorder            Plan:             Ray was seen today for follow-up.    Diagnoses and all orders  for this visit:    Depression, unspecified depression type  Chrnoic; uncontorlled  -- Patient is NOT at goal today  -- Labs are reviewed   --regimen will resume with zoloft 25  --  return for  follow up 3 motnhs     -     sertraline (ZOLOFT) 25 MG tablet; Take 1 tablet (25 mg total) by mouth once daily.    Acute stress disorder  -     sertraline (ZOLOFT) 25 MG tablet; Take 1 tablet (25 mg total) by mouth once daily.    Prediabetes              Future Appointments   Date Time Provider Department [facility]   8/12/2020  8:15 AM LAB, [place] [facility]   8/12/2020  9:20 AM Jericho Chavira III, MD [facility]         Medication List with Changes/Refills   Current Medications    SERTRALINE (ZOLOFT) 25 MG TABLET    Take 1 tablet (25 mg total) by mouth once daily.         Disclaimer:  This note has been generated using voice-recognition software. There may be grammatical or spelling errors that have been missed during proof-reading

## 2020-08-13 NOTE — PROGRESS NOTES
Greetings    The results of your lab work showed persistently and slightly elevated hemoglobin a1c, the testing for diabetes, putting you in the prediabetic range. Otherwise the results were all normal .     To reverse this process recommend for diet and exercise to include 4-5 servings of fruits and vegetables per day, avoidance of fast food, fried food, sweetend beverages and to exercise 150min/week.     Please let me know if you have any questions

## 2020-11-09 ENCOUNTER — OFFICE VISIT (OUTPATIENT)
Dept: INTERNAL MEDICINE | Facility: CLINIC | Age: 60
End: 2020-11-09
Payer: COMMERCIAL

## 2020-11-09 VITALS
OXYGEN SATURATION: 98 % | HEART RATE: 80 BPM | SYSTOLIC BLOOD PRESSURE: 132 MMHG | BODY MASS INDEX: 36.7 KG/M2 | DIASTOLIC BLOOD PRESSURE: 86 MMHG | HEIGHT: 71 IN | WEIGHT: 262.13 LBS | TEMPERATURE: 97 F

## 2020-11-09 DIAGNOSIS — R73.03 PREDIABETES: Primary | ICD-10-CM

## 2020-11-09 DIAGNOSIS — F32.A DEPRESSION, UNSPECIFIED DEPRESSION TYPE: ICD-10-CM

## 2020-11-09 DIAGNOSIS — Z13.220 SCREENING FOR HYPERLIPIDEMIA: ICD-10-CM

## 2020-11-09 DIAGNOSIS — Z23 NEED FOR VACCINATION: ICD-10-CM

## 2020-11-09 PROCEDURE — 99213 PR OFFICE/OUTPT VISIT, EST, LEVL III, 20-29 MIN: ICD-10-PCS | Mod: S$GLB,,, | Performed by: INTERNAL MEDICINE

## 2020-11-09 PROCEDURE — 3008F BODY MASS INDEX DOCD: CPT | Mod: CPTII,S$GLB,, | Performed by: INTERNAL MEDICINE

## 2020-11-09 PROCEDURE — 99213 OFFICE O/P EST LOW 20 MIN: CPT | Mod: S$GLB,,, | Performed by: INTERNAL MEDICINE

## 2020-11-09 PROCEDURE — 99999 PR PBB SHADOW E&M-EST. PATIENT-LVL IV: ICD-10-PCS | Mod: PBBFAC,,, | Performed by: INTERNAL MEDICINE

## 2020-11-09 PROCEDURE — 99999 PR PBB SHADOW E&M-EST. PATIENT-LVL IV: CPT | Mod: PBBFAC,,, | Performed by: INTERNAL MEDICINE

## 2020-11-09 PROCEDURE — 3008F PR BODY MASS INDEX (BMI) DOCUMENTED: ICD-10-PCS | Mod: CPTII,S$GLB,, | Performed by: INTERNAL MEDICINE

## 2020-11-09 NOTE — PATIENT INSTRUCTIONS
We were happy to see you today    For your Testing  Please have your labs and imaging test done prior to the next visit       For your Medication   Try the valerium tea for sleep  For more information about side effects please visit medlineplus.gov      For your Referrals  See below          Please return to clinic in    6 weeks    Extra resources     1. [employer]  2. [facility] [phone]  3. Meditation apps Headspace and insight timer       For headache or pain  You can use tylenol regular strength 1-2 tabs every 4-6  (not more than 10 tab in a day) or extra strength ( not more than 6 tabs in a day) or advil 1-2 tabs every 4-6 hrs (not more than 6 tabs)

## 2020-11-09 NOTE — PROGRESS NOTES
"Subjective:       Patient ID: Jonh Richards is a 60 y.o. male.    Chief Complaint:   No chief complaint on file.      HPI    #Interim Hx      #Concerns Today    Depression/Anxiety  -- interim hx; still having issues at work   -- Provider: psych, therapy has not seen   -- medication: patient self increase dose to 100 mg daily   -- Diet/exercise; poor exercise  -- substance use; none      #Chronic Problems    PreDM    Lab Results   Component Value Date    HGBA1C 6.4 (H) 08/12/2020       Health Maintenance Due   Topic Date Due    Influenza Vaccine (1) 08/01/2020     Health Maintenance Topics with due status: Not Due       Topic Last Completion Date    TETANUS VACCINE 09/26/2018    Colorectal Cancer Screening 11/02/2018    Lipid Panel 02/28/2020       Review of Systems   Constitutional: Negative for activity change, appetite change, fatigue and fever.   Respiratory: Negative for shortness of breath.    Cardiovascular: Negative for chest pain.   Gastrointestinal: Negative for abdominal pain.   Genitourinary: Negative for difficulty urinating.   Neurological: Negative for syncope and headaches.       Objective:   /86 (BP Location: Right arm, Patient Position: Sitting, BP Method: Large (Manual))   Pulse 80   Temp 97.3 °F (36.3 °C)   Ht 5' 11" (1.803 m)   Wt 118.9 kg (262 lb 2 oz)   SpO2 98%   BMI 36.56 kg/m²        Physical Exam  Vitals signs and nursing note reviewed.   Constitutional:       General: He is not in acute distress.     Appearance: He is well-developed. He is not diaphoretic.   HENT:      Head: Normocephalic.      Mouth/Throat:      Pharynx: No oropharyngeal exudate.   Eyes:      General:         Right eye: No discharge.         Left eye: No discharge.      Conjunctiva/sclera: Conjunctivae normal.      Pupils: Pupils are equal, round, and reactive to light.   Cardiovascular:      Rate and Rhythm: Normal rate and regular rhythm.      Heart sounds: Normal heart sounds. No murmur. No friction " rub. No gallop.    Pulmonary:      Effort: Pulmonary effort is normal. No respiratory distress.      Breath sounds: No stridor. No wheezing or rales.   Chest:      Chest wall: No tenderness.   Abdominal:      General: There is no distension.      Palpations: Abdomen is soft.   Skin:     General: Skin is warm.   Neurological:      Mental Status: He is alert.             ASCVD  The 10-year ASCVD risk score (Boyd AVENDANO Jr., et al., 2013) is: 6.6%    Values used to calculate the score:      Age: 60 years      Sex: Male      Is Non- : Yes      Diabetic: No      Tobacco smoker: No      Systolic Blood Pressure: 120 mmHg      Is BP treated: No      HDL Cholesterol: 54 mg/dL      Total Cholesterol: 173 mg/dL    Basic Labs  BMP  Lab Results   Component Value Date     02/28/2020    K 4.4 02/28/2020     02/28/2020    CO2 29 02/28/2020    BUN 20 02/28/2020    CREATININE 1.3 02/28/2020    CALCIUM 9.3 02/28/2020    ANIONGAP 6 (L) 02/28/2020    ESTGFRAFRICA >60.0 02/28/2020    EGFRNONAA 59.7 (A) 02/28/2020     Lab Results   Component Value Date    ALT 22 02/28/2020    AST 21 02/28/2020    ALKPHOS 46 (L) 02/28/2020    BILITOT 0.4 02/28/2020       Lab Results   Component Value Date    TSH 0.764 02/28/2020       STD    Lab Results   Component Value Date    HEPCAB Negative 02/28/2020         Lipids  Lab Results   Component Value Date    CHOL 173 02/28/2020     Lab Results   Component Value Date    HDL 54 02/28/2020     Lab Results   Component Value Date    LDLCALC 110.8 02/28/2020     Lab Results   Component Value Date    TRIG 41 02/28/2020     Lab Results   Component Value Date    CHOLHDL 31.2 02/28/2020       DM  Lab Results   Component Value Date    HGBA1C 6.4 (H) 08/12/2020       PSA  PSA, Screen (ng/mL)   Date Value   11/22/2016 2.2       Assessment:       1. Prediabetes    2. Need for vaccination    3. Screening for hyperlipidemia    4. Depression, unspecified depression type            Plan:            Jonh was seen today for anxiety.    Diagnoses and all orders for this visit:    Prediabetes  Chronic; stable  Repeat labs    -     Hemoglobin A1C; Standing  -     Basic Metabolic Panel; Future    Screening for hyperlipidemia  -     Lipid Panel; Standing    Depression, unspecified depression type  Chronic; uncontrolled  -- Patient is NOT at goal today  -- pt self adjusted regimen    - Given he change to 100mg 2 weeks ago will cont that dose for now f/u in 6 weeks  -- Given resources for finding therapist which he is interseted in now  --  return for  follow up 6 weeks                Future Appointments   Date Time Provider Department [place]   12/19/2020  8:30 AM LAB, [place] [facility]   12/21/2020 11:40 AM Jericho Chavira III, MD [facility]         Medication List with Changes/Refills   Current Medications    SERTRALINE (ZOLOFT) 25 MG TABLET    Take 1 tablet (25 mg total) by mouth once daily.         Disclaimer:  This note has been generated using voice-recognition software. There may be grammatical or spelling errors that have been missed during proof-reading

## 2020-11-10 PROBLEM — F32.1 CURRENT MODERATE EPISODE OF MAJOR DEPRESSIVE DISORDER WITHOUT PRIOR EPISODE: Status: ACTIVE | Noted: 2020-11-10

## 2020-12-21 ENCOUNTER — OFFICE VISIT (OUTPATIENT)
Dept: INTERNAL MEDICINE | Facility: CLINIC | Age: 60
End: 2020-12-21

## 2020-12-21 VITALS
HEIGHT: 71 IN | HEART RATE: 63 BPM | RESPIRATION RATE: 18 BRPM | SYSTOLIC BLOOD PRESSURE: 134 MMHG | DIASTOLIC BLOOD PRESSURE: 78 MMHG | TEMPERATURE: 97 F | WEIGHT: 261.25 LBS | OXYGEN SATURATION: 98 % | BODY MASS INDEX: 36.57 KG/M2

## 2020-12-21 DIAGNOSIS — E66.9 CLASS 2 OBESITY WITH BODY MASS INDEX (BMI) OF 35.0 TO 35.9 IN ADULT, UNSPECIFIED OBESITY TYPE, UNSPECIFIED WHETHER SERIOUS COMORBIDITY PRESENT: ICD-10-CM

## 2020-12-21 DIAGNOSIS — R73.03 PREDIABETES: Primary | ICD-10-CM

## 2020-12-21 DIAGNOSIS — F32.A DEPRESSION, UNSPECIFIED DEPRESSION TYPE: ICD-10-CM

## 2020-12-21 PROCEDURE — 99213 PR OFFICE/OUTPT VISIT, EST, LEVL III, 20-29 MIN: ICD-10-PCS | Mod: S$PBB,,, | Performed by: INTERNAL MEDICINE

## 2020-12-21 PROCEDURE — 99999 PR PBB SHADOW E&M-EST. PATIENT-LVL IV: CPT | Mod: PBBFAC,,, | Performed by: INTERNAL MEDICINE

## 2020-12-21 PROCEDURE — 99213 OFFICE O/P EST LOW 20 MIN: CPT | Mod: S$PBB,,, | Performed by: INTERNAL MEDICINE

## 2020-12-21 PROCEDURE — 99999 PR PBB SHADOW E&M-EST. PATIENT-LVL IV: ICD-10-PCS | Mod: PBBFAC,,, | Performed by: INTERNAL MEDICINE

## 2020-12-21 PROCEDURE — 99214 OFFICE O/P EST MOD 30 MIN: CPT | Mod: PBBFAC,PO | Performed by: INTERNAL MEDICINE

## 2020-12-21 NOTE — PROGRESS NOTES
"Subjective:       Patient ID: Jonh Richards is a 60 y.o. male.    Chief Complaint:   No chief complaint on file.      HPI    #Interim Hx    Patient reports having been fired from his job since our last visit.  He did lose insurance from the job    #Concerns Today    Depression/Anxiety  Patient states that since the last visit he feels much better.  He is no longer working at that job that was providing a lot of stress.  He was able to find a therapist.  He was taking the medication.  He is feeling much better.  He is worried however because he is now self pay as far as appointments and medication.        #Chronic Problems    PreDM    Lab Results   Component Value Date    HGBA1C 6.4 (H) 08/12/2020       There are no preventive care reminders to display for this patient.  Health Maintenance Topics with due status: Not Due       Topic Last Completion Date    TETANUS VACCINE 09/26/2018    Colorectal Cancer Screening 11/02/2018    Lipid Panel 02/28/2020       Review of Systems   Constitutional: Negative for activity change, appetite change, fatigue and fever.   Respiratory: Negative for shortness of breath.    Cardiovascular: Negative for chest pain.   Gastrointestinal: Negative for abdominal pain.   Genitourinary: Negative for difficulty urinating.   Neurological: Negative for syncope and headaches.       Objective:   /78 (BP Location: Right arm, Patient Position: Sitting, BP Method: Medium (Manual))   Pulse 63   Temp 97.3 °F (36.3 °C) (Oral)   Resp 18   Ht 5' 11" (1.803 m)   Wt 118.5 kg (261 lb 3.9 oz)   SpO2 98%   BMI 36.44 kg/m²        Physical Exam  Vitals signs and nursing note reviewed.   Constitutional:       General: He is not in acute distress.     Appearance: He is well-developed. He is not diaphoretic.   HENT:      Head: Normocephalic.      Mouth/Throat:      Pharynx: No oropharyngeal exudate.   Eyes:      General:         Right eye: No discharge.         Left eye: No discharge.      " Conjunctiva/sclera: Conjunctivae normal.      Pupils: Pupils are equal, round, and reactive to light.   Cardiovascular:      Rate and Rhythm: Normal rate and regular rhythm.      Heart sounds: Normal heart sounds. No murmur. No friction rub. No gallop.    Pulmonary:      Effort: Pulmonary effort is normal. No respiratory distress.      Breath sounds: No stridor. No wheezing or rales.   Chest:      Chest wall: No tenderness.   Abdominal:      General: There is no distension.      Palpations: Abdomen is soft.   Skin:     General: Skin is warm.   Neurological:      Mental Status: He is alert.             ASCVD  The 10-year ASCVD risk score (Gladstonesamy AVENDANO JrRosalee, et al., 2013) is: 7.8%    Values used to calculate the score:      Age: 60 years      Sex: Male      Is Non- : Yes      Diabetic: No      Tobacco smoker: No      Systolic Blood Pressure: 132 mmHg      Is BP treated: No      HDL Cholesterol: 54 mg/dL      Total Cholesterol: 173 mg/dL    Basic Labs  BMP  Lab Results   Component Value Date     02/28/2020    K 4.4 02/28/2020     02/28/2020    CO2 29 02/28/2020    BUN 20 02/28/2020    CREATININE 1.3 02/28/2020    CALCIUM 9.3 02/28/2020    ANIONGAP 6 (L) 02/28/2020    ESTGFRAFRICA >60.0 02/28/2020    EGFRNONAA 59.7 (A) 02/28/2020     Lab Results   Component Value Date    ALT 22 02/28/2020    AST 21 02/28/2020    ALKPHOS 46 (L) 02/28/2020    BILITOT 0.4 02/28/2020       Lab Results   Component Value Date    TSH 0.764 02/28/2020       STD    Lab Results   Component Value Date    HEPCAB Negative 02/28/2020         Lipids  Lab Results   Component Value Date    CHOL 173 02/28/2020     Lab Results   Component Value Date    HDL 54 02/28/2020     Lab Results   Component Value Date    LDLCALC 110.8 02/28/2020     Lab Results   Component Value Date    TRIG 41 02/28/2020     Lab Results   Component Value Date    CHOLHDL 31.2 02/28/2020       DM  Lab Results   Component Value Date    HGBA1C 6.4 (H)  08/12/2020       PSA  PSA, Screen (ng/mL)   Date Value   11/22/2016 2.2       Assessment:       1. Prediabetes            Plan:           Jonh was seen today for anxiety.    Diagnoses and all orders for this visit:    Prediabetes  Chronic;controlled  -- Patient is at goal today  -- Labs are reviewed. He would like to hold off on repeat labs for insurance purposes  -- regimen will cont as below with diet controled  --  return for follow up 3 months   -     Comprehensive Metabolic Panel; Future  -     Lipid Panel; Future  -     Hemoglobin A1C; Future    Class 2 obesity with body mass index (BMI) of 35.0 to 35.9 in adult, unspecified obesity type, unspecified whether serious comorbidity present  I reviewed routine weight management option and recommendation for diet and exercise to include 4-5 servings of fruits and vegetables per day, avoidance of fast food, fried food, sweetend beverages and to exercise 150min/week.       Depression, unspecified depression type  Chronic;improving  Since starting therapy and medicaiotn he is feeling much better  Continue to follow  RTC in 3 months with vv              Future Appointments   Date Time Provider Department Center   3/18/2021  8:40 AM LAB, [place] [facility]   3/22/2021  8:40 AM Jericho Chavira III, [facility] [place]         Medication List with Changes/Refills   Current Medications    SERTRALINE (ZOLOFT) 25 MG TABLET    Take 1 tablet (25 mg total) by mouth once daily.         Disclaimer:  This note has been generated using voice-recognition software. There may be grammatical or spelling errors that have been missed during proof-reading

## 2020-12-21 NOTE — PATIENT INSTRUCTIONS
We were happy to see you today    For your Testing  Please have your labs and imaging test done prior to your next visit       For your Medication   You can start with some   1. Home therpay - follow the print out   2.  some shoe insert   3. For pain start with some topical mediation    1. Kaushal-haddad  2. Icy-hot  3. Aprecreme  4. Diclofenac    If that does not work try the oral medication    You can use tylenol regular strength 1-2 tabs every 4-6  (not more than 10 tab in a day) or extra strength ( not more than 6 tabs in a day) or advil 1-2 tabs every 4-6 hrs (not more than 6 tabs)       For more information about side effects please visit medlineplus.gov          Please return to clinic in    3 months as a video visit

## 2021-01-26 ENCOUNTER — TELEPHONE (OUTPATIENT)
Dept: FAMILY MEDICINE | Facility: CLINIC | Age: 61
End: 2021-01-26

## 2021-01-26 ENCOUNTER — TELEPHONE (OUTPATIENT)
Dept: INTERNAL MEDICINE | Facility: CLINIC | Age: 61
End: 2021-01-26

## 2021-03-08 ENCOUNTER — TELEPHONE (OUTPATIENT)
Dept: INTERNAL MEDICINE | Facility: CLINIC | Age: 61
End: 2021-03-08

## 2021-03-08 ENCOUNTER — OFFICE VISIT (OUTPATIENT)
Dept: INTERNAL MEDICINE | Facility: CLINIC | Age: 61
End: 2021-03-08
Payer: MEDICAID

## 2021-03-08 ENCOUNTER — LAB VISIT (OUTPATIENT)
Dept: LAB | Facility: HOSPITAL | Age: 61
End: 2021-03-08
Attending: INTERNAL MEDICINE
Payer: MEDICAID

## 2021-03-08 VITALS
HEIGHT: 71 IN | OXYGEN SATURATION: 99 % | RESPIRATION RATE: 17 BRPM | WEIGHT: 260.13 LBS | BODY MASS INDEX: 36.42 KG/M2 | SYSTOLIC BLOOD PRESSURE: 130 MMHG | TEMPERATURE: 97 F | DIASTOLIC BLOOD PRESSURE: 76 MMHG | HEART RATE: 64 BPM

## 2021-03-08 DIAGNOSIS — Z13.220 SCREENING FOR HYPERLIPIDEMIA: ICD-10-CM

## 2021-03-08 DIAGNOSIS — R73.03 PREDIABETES: Primary | ICD-10-CM

## 2021-03-08 DIAGNOSIS — R73.03 PREDIABETES: ICD-10-CM

## 2021-03-08 DIAGNOSIS — F32.A DEPRESSION, UNSPECIFIED DEPRESSION TYPE: ICD-10-CM

## 2021-03-08 DIAGNOSIS — F43.0 ACUTE STRESS DISORDER: ICD-10-CM

## 2021-03-08 LAB
ALBUMIN SERPL BCP-MCNC: 3.7 G/DL (ref 3.5–5.2)
ALP SERPL-CCNC: 42 U/L (ref 55–135)
ALT SERPL W/O P-5'-P-CCNC: 26 U/L (ref 10–44)
ANION GAP SERPL CALC-SCNC: 8 MMOL/L (ref 8–16)
ANION GAP SERPL CALC-SCNC: 8 MMOL/L (ref 8–16)
AST SERPL-CCNC: 21 U/L (ref 10–40)
BILIRUB SERPL-MCNC: 0.4 MG/DL (ref 0.1–1)
BUN SERPL-MCNC: 15 MG/DL (ref 6–20)
BUN SERPL-MCNC: 15 MG/DL (ref 6–20)
CALCIUM SERPL-MCNC: 8.7 MG/DL (ref 8.7–10.5)
CALCIUM SERPL-MCNC: 8.7 MG/DL (ref 8.7–10.5)
CHLORIDE SERPL-SCNC: 105 MMOL/L (ref 95–110)
CHLORIDE SERPL-SCNC: 105 MMOL/L (ref 95–110)
CHOLEST SERPL-MCNC: 172 MG/DL (ref 120–199)
CHOLEST SERPL-MCNC: 172 MG/DL (ref 120–199)
CHOLEST/HDLC SERPL: 3.6 {RATIO} (ref 2–5)
CHOLEST/HDLC SERPL: 3.6 {RATIO} (ref 2–5)
CO2 SERPL-SCNC: 25 MMOL/L (ref 23–29)
CO2 SERPL-SCNC: 25 MMOL/L (ref 23–29)
CREAT SERPL-MCNC: 1.2 MG/DL (ref 0.5–1.4)
CREAT SERPL-MCNC: 1.2 MG/DL (ref 0.5–1.4)
EST. GFR  (AFRICAN AMERICAN): >60 ML/MIN/1.73 M^2
EST. GFR  (AFRICAN AMERICAN): >60 ML/MIN/1.73 M^2
EST. GFR  (NON AFRICAN AMERICAN): >60 ML/MIN/1.73 M^2
EST. GFR  (NON AFRICAN AMERICAN): >60 ML/MIN/1.73 M^2
GLUCOSE SERPL-MCNC: 104 MG/DL (ref 70–110)
GLUCOSE SERPL-MCNC: 104 MG/DL (ref 70–110)
HDLC SERPL-MCNC: 48 MG/DL (ref 40–75)
HDLC SERPL-MCNC: 48 MG/DL (ref 40–75)
HDLC SERPL: 27.9 % (ref 20–50)
HDLC SERPL: 27.9 % (ref 20–50)
LDLC SERPL CALC-MCNC: 113 MG/DL (ref 63–159)
LDLC SERPL CALC-MCNC: 113 MG/DL (ref 63–159)
NONHDLC SERPL-MCNC: 124 MG/DL
NONHDLC SERPL-MCNC: 124 MG/DL
POTASSIUM SERPL-SCNC: 4.4 MMOL/L (ref 3.5–5.1)
POTASSIUM SERPL-SCNC: 4.4 MMOL/L (ref 3.5–5.1)
PROT SERPL-MCNC: 7.4 G/DL (ref 6–8.4)
SODIUM SERPL-SCNC: 138 MMOL/L (ref 136–145)
SODIUM SERPL-SCNC: 138 MMOL/L (ref 136–145)
TRIGL SERPL-MCNC: 55 MG/DL (ref 30–150)
TRIGL SERPL-MCNC: 55 MG/DL (ref 30–150)

## 2021-03-08 PROCEDURE — 36415 COLL VENOUS BLD VENIPUNCTURE: CPT | Mod: PO | Performed by: INTERNAL MEDICINE

## 2021-03-08 PROCEDURE — 80053 COMPREHEN METABOLIC PANEL: CPT | Performed by: INTERNAL MEDICINE

## 2021-03-08 PROCEDURE — 99213 OFFICE O/P EST LOW 20 MIN: CPT | Mod: PBBFAC,PO | Performed by: INTERNAL MEDICINE

## 2021-03-08 PROCEDURE — 83036 HEMOGLOBIN GLYCOSYLATED A1C: CPT | Performed by: INTERNAL MEDICINE

## 2021-03-08 PROCEDURE — 80061 LIPID PANEL: CPT | Performed by: INTERNAL MEDICINE

## 2021-03-08 PROCEDURE — 99999 PR PBB SHADOW E&M-EST. PATIENT-LVL III: ICD-10-PCS | Mod: PBBFAC,,, | Performed by: INTERNAL MEDICINE

## 2021-03-08 PROCEDURE — 99214 OFFICE O/P EST MOD 30 MIN: CPT | Mod: S$PBB,,, | Performed by: INTERNAL MEDICINE

## 2021-03-08 PROCEDURE — 99214 PR OFFICE/OUTPT VISIT, EST, LEVL IV, 30-39 MIN: ICD-10-PCS | Mod: S$PBB,,, | Performed by: INTERNAL MEDICINE

## 2021-03-08 PROCEDURE — 99999 PR PBB SHADOW E&M-EST. PATIENT-LVL III: CPT | Mod: PBBFAC,,, | Performed by: INTERNAL MEDICINE

## 2021-03-08 RX ORDER — SERTRALINE HYDROCHLORIDE 25 MG/1
25 TABLET, FILM COATED ORAL DAILY
Qty: 90 TABLET | Refills: 0 | Status: SHIPPED | OUTPATIENT
Start: 2021-03-08 | End: 2021-06-08

## 2021-03-09 LAB
ESTIMATED AVG GLUCOSE: 143 MG/DL (ref 68–131)
HBA1C MFR BLD: 6.6 % (ref 4–5.6)

## 2021-03-12 ENCOUNTER — PATIENT MESSAGE (OUTPATIENT)
Dept: INTERNAL MEDICINE | Facility: CLINIC | Age: 61
End: 2021-03-12

## 2021-03-12 DIAGNOSIS — E11.69 TYPE 2 DIABETES MELLITUS WITH OTHER SPECIFIED COMPLICATION, WITHOUT LONG-TERM CURRENT USE OF INSULIN: Primary | ICD-10-CM

## 2021-03-12 RX ORDER — METFORMIN HYDROCHLORIDE 500 MG/1
500 TABLET ORAL 2 TIMES DAILY WITH MEALS
Qty: 180 TABLET | Refills: 0 | Status: SHIPPED | OUTPATIENT
Start: 2021-03-12 | End: 2021-04-27 | Stop reason: SDUPTHER

## 2021-03-12 RX ORDER — ATORVASTATIN CALCIUM 10 MG/1
10 TABLET, FILM COATED ORAL DAILY
Qty: 90 TABLET | Refills: 0 | Status: SHIPPED | OUTPATIENT
Start: 2021-03-12 | End: 2021-04-27 | Stop reason: SDUPTHER

## 2021-04-27 ENCOUNTER — OFFICE VISIT (OUTPATIENT)
Dept: INTERNAL MEDICINE | Facility: CLINIC | Age: 61
End: 2021-04-27
Payer: MEDICAID

## 2021-04-27 ENCOUNTER — OUTPATIENT CASE MANAGEMENT (OUTPATIENT)
Dept: ADMINISTRATIVE | Facility: OTHER | Age: 61
End: 2021-04-27

## 2021-04-27 VITALS
RESPIRATION RATE: 18 BRPM | OXYGEN SATURATION: 99 % | HEART RATE: 72 BPM | WEIGHT: 250 LBS | DIASTOLIC BLOOD PRESSURE: 72 MMHG | BODY MASS INDEX: 35 KG/M2 | HEIGHT: 71 IN | SYSTOLIC BLOOD PRESSURE: 124 MMHG

## 2021-04-27 DIAGNOSIS — Z59.89 UNINSURED: ICD-10-CM

## 2021-04-27 DIAGNOSIS — M54.9 DORSALGIA, UNSPECIFIED: Primary | ICD-10-CM

## 2021-04-27 DIAGNOSIS — Z56.89: ICD-10-CM

## 2021-04-27 DIAGNOSIS — Z80.42 FAMILY HISTORY OF PROSTATE CANCER: ICD-10-CM

## 2021-04-27 DIAGNOSIS — E11.69 TYPE 2 DIABETES MELLITUS WITH OTHER SPECIFIED COMPLICATION, WITHOUT LONG-TERM CURRENT USE OF INSULIN: ICD-10-CM

## 2021-04-27 DIAGNOSIS — E11.9 TYPE 2 DIABETES MELLITUS WITHOUT COMPLICATION, WITHOUT LONG-TERM CURRENT USE OF INSULIN: ICD-10-CM

## 2021-04-27 PROBLEM — M25.562 ACUTE PAIN OF LEFT KNEE: Status: RESOLVED | Noted: 2018-09-26 | Resolved: 2021-04-27

## 2021-04-27 PROBLEM — Z23 NEEDS FLU SHOT: Status: RESOLVED | Noted: 2018-09-26 | Resolved: 2021-04-27

## 2021-04-27 PROBLEM — Z23 NEED FOR TDAP VACCINATION: Status: RESOLVED | Noted: 2018-09-26 | Resolved: 2021-04-27

## 2021-04-27 PROBLEM — Z12.11 SCREENING FOR MALIGNANT NEOPLASM OF COLON: Status: RESOLVED | Noted: 2018-11-02 | Resolved: 2021-04-27

## 2021-04-27 PROCEDURE — 99214 OFFICE O/P EST MOD 30 MIN: CPT | Mod: PBBFAC,PO | Performed by: INTERNAL MEDICINE

## 2021-04-27 PROCEDURE — 99214 PR OFFICE/OUTPT VISIT, EST, LEVL IV, 30-39 MIN: ICD-10-PCS | Mod: S$PBB,,, | Performed by: INTERNAL MEDICINE

## 2021-04-27 PROCEDURE — 99999 PR PBB SHADOW E&M-EST. PATIENT-LVL IV: CPT | Mod: PBBFAC,,, | Performed by: INTERNAL MEDICINE

## 2021-04-27 PROCEDURE — 99999 PR PBB SHADOW E&M-EST. PATIENT-LVL IV: ICD-10-PCS | Mod: PBBFAC,,, | Performed by: INTERNAL MEDICINE

## 2021-04-27 PROCEDURE — 99214 OFFICE O/P EST MOD 30 MIN: CPT | Mod: S$PBB,,, | Performed by: INTERNAL MEDICINE

## 2021-04-27 RX ORDER — METFORMIN HYDROCHLORIDE 500 MG/1
500 TABLET ORAL 2 TIMES DAILY WITH MEALS
Qty: 180 TABLET | Refills: 0 | Status: SHIPPED | OUTPATIENT
Start: 2021-04-27 | End: 2022-03-23

## 2021-04-27 RX ORDER — CYCLOBENZAPRINE HCL 10 MG
10 TABLET ORAL 3 TIMES DAILY PRN
Qty: 15 TABLET | Refills: 0 | Status: SHIPPED | OUTPATIENT
Start: 2021-04-27 | End: 2021-05-07

## 2021-04-27 RX ORDER — ATORVASTATIN CALCIUM 10 MG/1
10 TABLET, FILM COATED ORAL DAILY
Qty: 90 TABLET | Refills: 0 | Status: SHIPPED | OUTPATIENT
Start: 2021-04-27 | End: 2021-10-29 | Stop reason: SDUPTHER

## 2021-04-27 SDOH — SOCIAL DETERMINANTS OF HEALTH (SDOH): OTHER PROBLEMS RELATED TO HOUSING AND ECONOMIC CIRCUMSTANCES: Z59.89

## 2021-05-06 ENCOUNTER — OUTPATIENT CASE MANAGEMENT (OUTPATIENT)
Dept: ADMINISTRATIVE | Facility: OTHER | Age: 61
End: 2021-05-06

## 2021-05-07 ENCOUNTER — HOSPITAL ENCOUNTER (OUTPATIENT)
Dept: RADIOLOGY | Facility: HOSPITAL | Age: 61
Discharge: HOME | End: 2021-05-07
Attending: INTERNAL MEDICINE
Payer: MEDICAID

## 2021-05-07 ENCOUNTER — PATIENT MESSAGE (OUTPATIENT)
Dept: INTERNAL MEDICINE | Facility: CLINIC | Age: 61
End: 2021-05-07

## 2021-05-07 DIAGNOSIS — M54.9 DORSALGIA, UNSPECIFIED: Primary | ICD-10-CM

## 2021-05-07 DIAGNOSIS — M54.9 DORSALGIA, UNSPECIFIED: ICD-10-CM

## 2021-05-07 PROCEDURE — 72110 X-RAY EXAM L-2 SPINE 4/>VWS: CPT | Mod: TC,FY,PO

## 2021-05-12 ENCOUNTER — OUTPATIENT CASE MANAGEMENT (OUTPATIENT)
Dept: ADMINISTRATIVE | Facility: OTHER | Age: 61
End: 2021-05-12

## 2021-05-13 ENCOUNTER — OUTPATIENT CASE MANAGEMENT (OUTPATIENT)
Dept: ADMINISTRATIVE | Facility: OTHER | Age: 61
End: 2021-05-13

## 2021-06-04 ENCOUNTER — LAB VISIT (OUTPATIENT)
Dept: LAB | Facility: HOSPITAL | Age: 61
End: 2021-06-04
Attending: INTERNAL MEDICINE
Payer: MEDICAID

## 2021-06-04 DIAGNOSIS — R73.03 PREDIABETES: ICD-10-CM

## 2021-06-04 LAB
ESTIMATED AVG GLUCOSE: 131 MG/DL (ref 68–131)
HBA1C MFR BLD: 6.2 % (ref 4–5.6)

## 2021-06-04 PROCEDURE — 83036 HEMOGLOBIN GLYCOSYLATED A1C: CPT | Performed by: INTERNAL MEDICINE

## 2021-06-04 PROCEDURE — 36415 COLL VENOUS BLD VENIPUNCTURE: CPT | Mod: PO | Performed by: INTERNAL MEDICINE

## 2021-06-08 ENCOUNTER — OFFICE VISIT (OUTPATIENT)
Dept: INTERNAL MEDICINE | Facility: CLINIC | Age: 61
End: 2021-06-08
Payer: MEDICAID

## 2021-06-08 ENCOUNTER — OUTPATIENT CASE MANAGEMENT (OUTPATIENT)
Dept: ADMINISTRATIVE | Facility: OTHER | Age: 61
End: 2021-06-08

## 2021-06-08 VITALS
WEIGHT: 251.31 LBS | OXYGEN SATURATION: 98 % | DIASTOLIC BLOOD PRESSURE: 80 MMHG | BODY MASS INDEX: 35.18 KG/M2 | HEIGHT: 71 IN | HEART RATE: 57 BPM | SYSTOLIC BLOOD PRESSURE: 124 MMHG | RESPIRATION RATE: 17 BRPM

## 2021-06-08 DIAGNOSIS — Z80.42 FAMILY HISTORY OF PROSTATE CANCER: ICD-10-CM

## 2021-06-08 DIAGNOSIS — E11.69 TYPE 2 DIABETES MELLITUS WITH OTHER SPECIFIED COMPLICATION, WITHOUT LONG-TERM CURRENT USE OF INSULIN: Primary | ICD-10-CM

## 2021-06-08 DIAGNOSIS — R06.83 SNORING: ICD-10-CM

## 2021-06-08 DIAGNOSIS — F32.A DEPRESSION, UNSPECIFIED DEPRESSION TYPE: ICD-10-CM

## 2021-06-08 PROCEDURE — 99214 OFFICE O/P EST MOD 30 MIN: CPT | Mod: S$PBB,,, | Performed by: INTERNAL MEDICINE

## 2021-06-08 PROCEDURE — 99999 PR PBB SHADOW E&M-EST. PATIENT-LVL III: CPT | Mod: PBBFAC,,, | Performed by: INTERNAL MEDICINE

## 2021-06-08 PROCEDURE — 99213 OFFICE O/P EST LOW 20 MIN: CPT | Mod: PBBFAC,PO | Performed by: INTERNAL MEDICINE

## 2021-06-08 PROCEDURE — 99999 PR PBB SHADOW E&M-EST. PATIENT-LVL III: ICD-10-PCS | Mod: PBBFAC,,, | Performed by: INTERNAL MEDICINE

## 2021-06-08 PROCEDURE — 99214 PR OFFICE/OUTPT VISIT, EST, LEVL IV, 30-39 MIN: ICD-10-PCS | Mod: S$PBB,,, | Performed by: INTERNAL MEDICINE

## 2021-06-09 ENCOUNTER — TELEPHONE (OUTPATIENT)
Dept: INTERNAL MEDICINE | Facility: CLINIC | Age: 61
End: 2021-06-09

## 2021-07-06 ENCOUNTER — PATIENT MESSAGE (OUTPATIENT)
Dept: ADMINISTRATIVE | Facility: HOSPITAL | Age: 61
End: 2021-07-06

## 2021-07-07 DIAGNOSIS — E11.9 TYPE 2 DIABETES MELLITUS WITHOUT COMPLICATION, UNSPECIFIED WHETHER LONG TERM INSULIN USE: ICD-10-CM

## 2021-07-13 ENCOUNTER — PATIENT OUTREACH (OUTPATIENT)
Dept: ADMINISTRATIVE | Facility: HOSPITAL | Age: 61
End: 2021-07-13

## 2021-07-13 ENCOUNTER — PATIENT MESSAGE (OUTPATIENT)
Dept: ADMINISTRATIVE | Facility: HOSPITAL | Age: 61
End: 2021-07-13

## 2021-07-20 ENCOUNTER — TELEPHONE (OUTPATIENT)
Dept: INTERNAL MEDICINE | Facility: CLINIC | Age: 61
End: 2021-07-20

## 2021-07-28 ENCOUNTER — HOSPITAL ENCOUNTER (OUTPATIENT)
Dept: RADIOLOGY | Facility: HOSPITAL | Age: 61
Discharge: HOME | End: 2021-07-28
Attending: INTERNAL MEDICINE
Payer: MEDICAID

## 2021-07-28 ENCOUNTER — OFFICE VISIT (OUTPATIENT)
Dept: INTERNAL MEDICINE | Facility: CLINIC | Age: 61
End: 2021-07-28
Payer: MEDICAID

## 2021-07-28 VITALS
HEIGHT: 71 IN | BODY MASS INDEX: 34.88 KG/M2 | DIASTOLIC BLOOD PRESSURE: 70 MMHG | OXYGEN SATURATION: 98 % | WEIGHT: 249.13 LBS | RESPIRATION RATE: 17 BRPM | HEART RATE: 57 BPM | SYSTOLIC BLOOD PRESSURE: 120 MMHG

## 2021-07-28 DIAGNOSIS — E11.69 TYPE 2 DIABETES MELLITUS WITH OTHER SPECIFIED COMPLICATION, WITHOUT LONG-TERM CURRENT USE OF INSULIN: ICD-10-CM

## 2021-07-28 DIAGNOSIS — F33.2 SEVERE EPISODE OF RECURRENT MAJOR DEPRESSIVE DISORDER, WITHOUT PSYCHOTIC FEATURES: Primary | ICD-10-CM

## 2021-07-28 DIAGNOSIS — G89.29 CHRONIC PAIN OF LEFT KNEE: ICD-10-CM

## 2021-07-28 DIAGNOSIS — M25.562 CHRONIC PAIN OF LEFT KNEE: ICD-10-CM

## 2021-07-28 DIAGNOSIS — M54.9 DORSALGIA, UNSPECIFIED: ICD-10-CM

## 2021-07-28 DIAGNOSIS — E66.09 CLASS 1 OBESITY DUE TO EXCESS CALORIES WITHOUT SERIOUS COMORBIDITY WITH BODY MASS INDEX (BMI) OF 34.0 TO 34.9 IN ADULT: ICD-10-CM

## 2021-07-28 PROCEDURE — 73565 X-RAY EXAM OF KNEES: CPT | Mod: TC,FY,PO

## 2021-07-28 PROCEDURE — 73562 XR KNEE 3 VIEW LEFT: ICD-10-PCS | Mod: 26,LT,, | Performed by: RADIOLOGY

## 2021-07-28 PROCEDURE — 99214 PR OFFICE/OUTPT VISIT, EST, LEVL IV, 30-39 MIN: ICD-10-PCS | Mod: S$PBB,,, | Performed by: INTERNAL MEDICINE

## 2021-07-28 PROCEDURE — 99214 OFFICE O/P EST MOD 30 MIN: CPT | Mod: S$PBB,,, | Performed by: INTERNAL MEDICINE

## 2021-07-28 PROCEDURE — 73562 X-RAY EXAM OF KNEE 3: CPT | Mod: 26,LT,, | Performed by: RADIOLOGY

## 2021-07-28 PROCEDURE — 73562 X-RAY EXAM OF KNEE 3: CPT | Mod: TC,FY,PO,LT

## 2021-07-28 PROCEDURE — 99214 OFFICE O/P EST MOD 30 MIN: CPT | Mod: PBBFAC,PO | Performed by: INTERNAL MEDICINE

## 2021-07-28 PROCEDURE — 96127 BRIEF EMOTIONAL/BEHAV ASSMT: CPT | Mod: PBBFAC,PO | Performed by: INTERNAL MEDICINE

## 2021-07-28 PROCEDURE — 99999 PR PBB SHADOW E&M-EST. PATIENT-LVL IV: ICD-10-PCS | Mod: PBBFAC,,, | Performed by: INTERNAL MEDICINE

## 2021-07-28 PROCEDURE — 99999 PR PBB SHADOW E&M-EST. PATIENT-LVL IV: CPT | Mod: PBBFAC,,, | Performed by: INTERNAL MEDICINE

## 2021-07-28 RX ORDER — DULOXETIN HYDROCHLORIDE 30 MG/1
30 CAPSULE, DELAYED RELEASE ORAL DAILY
Qty: 30 CAPSULE | Refills: 2 | Status: SHIPPED | OUTPATIENT
Start: 2021-07-28 | End: 2022-12-20

## 2021-07-30 ENCOUNTER — TELEPHONE (OUTPATIENT)
Dept: ADMINISTRATIVE | Facility: OTHER | Age: 61
End: 2021-07-30

## 2021-08-13 LAB
LEFT EYE DM RETINOPATHY: NEGATIVE
RIGHT EYE DM RETINOPATHY: NEGATIVE

## 2021-10-01 ENCOUNTER — PATIENT OUTREACH (OUTPATIENT)
Dept: ADMINISTRATIVE | Facility: HOSPITAL | Age: 61
End: 2021-10-01

## 2021-10-04 ENCOUNTER — PATIENT MESSAGE (OUTPATIENT)
Dept: ADMINISTRATIVE | Facility: HOSPITAL | Age: 61
End: 2021-10-04

## 2021-10-29 ENCOUNTER — OFFICE VISIT (OUTPATIENT)
Dept: INTERNAL MEDICINE | Facility: CLINIC | Age: 61
End: 2021-10-29
Payer: MEDICAID

## 2021-10-29 VITALS
SYSTOLIC BLOOD PRESSURE: 124 MMHG | HEART RATE: 61 BPM | OXYGEN SATURATION: 97 % | BODY MASS INDEX: 34.9 KG/M2 | HEIGHT: 71 IN | DIASTOLIC BLOOD PRESSURE: 80 MMHG | WEIGHT: 249.31 LBS

## 2021-10-29 DIAGNOSIS — E11.69 TYPE 2 DIABETES MELLITUS WITH OTHER SPECIFIED COMPLICATION, WITHOUT LONG-TERM CURRENT USE OF INSULIN: ICD-10-CM

## 2021-10-29 DIAGNOSIS — M54.9 DORSALGIA, UNSPECIFIED: Primary | ICD-10-CM

## 2021-10-29 DIAGNOSIS — Z23 NEED FOR VACCINATION: ICD-10-CM

## 2021-10-29 PROCEDURE — 99999 PR PBB SHADOW E&M-EST. PATIENT-LVL IV: ICD-10-PCS | Mod: PBBFAC,,, | Performed by: INTERNAL MEDICINE

## 2021-10-29 PROCEDURE — 99999 PR PBB SHADOW E&M-EST. PATIENT-LVL IV: CPT | Mod: PBBFAC,,, | Performed by: INTERNAL MEDICINE

## 2021-10-29 PROCEDURE — 99213 OFFICE O/P EST LOW 20 MIN: CPT | Mod: S$PBB,,, | Performed by: INTERNAL MEDICINE

## 2021-10-29 PROCEDURE — 99213 PR OFFICE/OUTPT VISIT, EST, LEVL III, 20-29 MIN: ICD-10-PCS | Mod: S$PBB,,, | Performed by: INTERNAL MEDICINE

## 2021-10-29 PROCEDURE — 90686 IIV4 VACC NO PRSV 0.5 ML IM: CPT | Mod: PBBFAC,PO

## 2021-10-29 PROCEDURE — 99214 OFFICE O/P EST MOD 30 MIN: CPT | Mod: PBBFAC,PO | Performed by: INTERNAL MEDICINE

## 2021-10-29 RX ORDER — ATORVASTATIN CALCIUM 10 MG/1
10 TABLET, FILM COATED ORAL DAILY
Qty: 90 TABLET | Refills: 0 | Status: SHIPPED | OUTPATIENT
Start: 2021-10-29 | End: 2022-12-20

## 2021-10-29 RX ORDER — DICLOFENAC SODIUM 10 MG/G
2 GEL TOPICAL 4 TIMES DAILY
Qty: 100 G | Refills: 0 | Status: SHIPPED | OUTPATIENT
Start: 2021-10-29 | End: 2022-03-23 | Stop reason: SDUPTHER

## 2021-11-05 ENCOUNTER — IMMUNIZATION (OUTPATIENT)
Dept: INTERNAL MEDICINE | Facility: CLINIC | Age: 61
End: 2021-11-05
Payer: MEDICAID

## 2021-11-05 DIAGNOSIS — Z23 NEED FOR VACCINATION: Primary | ICD-10-CM

## 2021-11-05 PROCEDURE — 0064A COVID-19, MRNA, LNP-S, PF, 100 MCG/0.25 ML DOSE VACCINE (MODERNA BOOSTER): CPT | Mod: PBBFAC,PO

## 2021-11-08 ENCOUNTER — TELEPHONE (OUTPATIENT)
Dept: INTERNAL MEDICINE | Facility: CLINIC | Age: 61
End: 2021-11-08
Payer: MEDICAID

## 2022-03-04 ENCOUNTER — PATIENT MESSAGE (OUTPATIENT)
Dept: ADMINISTRATIVE | Facility: HOSPITAL | Age: 62
End: 2022-03-04
Payer: MEDICAID

## 2022-03-15 ENCOUNTER — TELEPHONE (OUTPATIENT)
Dept: INTERNAL MEDICINE | Facility: CLINIC | Age: 62
End: 2022-03-15
Payer: MEDICAID

## 2022-03-15 NOTE — TELEPHONE ENCOUNTER
----- Message from Lise Dowell sent at 3/15/2022  8:25 AM CDT -----  Regarding: call back  Contact: [phone]  Type:  Needs Medical Advice    Who Called: PT   Symptoms (please be specific): Neck and back pain   How long has patient had these symptoms:  couple of days   Would the patient rather a call back or a response via [facility]? Call back   Best Call Back Number: [phone]  Additional Information:

## 2022-03-15 NOTE — TELEPHONE ENCOUNTER
----- Message from Lise Dowell sent at 3/15/2022  8:25 AM CDT -----  Regarding: call back  Contact: [phone]  Type:  Needs Medical Advice    Who Called: PT   Symptoms (please be specific): Neck and back pain   How long has patient had these symptoms:  couple of days   Would the patient rather a call back or a response via [facility]? Call back   Best Call Back Number: [phone]  Additional Information:

## 2022-03-21 ENCOUNTER — LAB VISIT (OUTPATIENT)
Dept: LAB | Facility: HOSPITAL | Age: 62
End: 2022-03-21
Attending: INTERNAL MEDICINE
Payer: MEDICAID

## 2022-03-21 DIAGNOSIS — Z80.42 FAMILY HISTORY OF PROSTATE CANCER: ICD-10-CM

## 2022-03-21 DIAGNOSIS — E11.69 TYPE 2 DIABETES MELLITUS WITH OTHER SPECIFIED COMPLICATION, WITHOUT LONG-TERM CURRENT USE OF INSULIN: ICD-10-CM

## 2022-03-21 LAB
ALBUMIN SERPL BCP-MCNC: 3.5 G/DL (ref 3.5–5.2)
ALP SERPL-CCNC: 44 U/L (ref 55–135)
ALT SERPL W/O P-5'-P-CCNC: 18 U/L (ref 10–44)
ANION GAP SERPL CALC-SCNC: 8 MMOL/L (ref 8–16)
AST SERPL-CCNC: 16 U/L (ref 10–40)
BILIRUB DIRECT SERPL-MCNC: 0.3 MG/DL (ref 0.1–0.3)
BILIRUB SERPL-MCNC: 0.6 MG/DL (ref 0.1–1)
BUN SERPL-MCNC: 16 MG/DL (ref 8–23)
CALCIUM SERPL-MCNC: 9.7 MG/DL (ref 8.7–10.5)
CHLORIDE SERPL-SCNC: 104 MMOL/L (ref 95–110)
CHOLEST SERPL-MCNC: 147 MG/DL (ref 120–199)
CHOLEST/HDLC SERPL: 3.3 {RATIO} (ref 2–5)
CO2 SERPL-SCNC: 29 MMOL/L (ref 23–29)
COMPLEXED PSA SERPL-MCNC: 3.4 NG/ML (ref 0–4)
CREAT SERPL-MCNC: 1.2 MG/DL (ref 0.5–1.4)
EST. GFR  (AFRICAN AMERICAN): >60 ML/MIN/1.73 M^2
EST. GFR  (NON AFRICAN AMERICAN): >60 ML/MIN/1.73 M^2
ESTIMATED AVG GLUCOSE: 134 MG/DL (ref 68–131)
GLUCOSE SERPL-MCNC: 114 MG/DL (ref 70–110)
HBA1C MFR BLD: 6.3 % (ref 4–5.6)
HDLC SERPL-MCNC: 44 MG/DL (ref 40–75)
HDLC SERPL: 29.9 % (ref 20–50)
LDLC SERPL CALC-MCNC: 93.8 MG/DL (ref 63–159)
NONHDLC SERPL-MCNC: 103 MG/DL
POTASSIUM SERPL-SCNC: 4.5 MMOL/L (ref 3.5–5.1)
PROT SERPL-MCNC: 7 G/DL (ref 6–8.4)
SODIUM SERPL-SCNC: 141 MMOL/L (ref 136–145)
TRIGL SERPL-MCNC: 46 MG/DL (ref 30–150)

## 2022-03-21 PROCEDURE — 80048 BASIC METABOLIC PNL TOTAL CA: CPT | Performed by: INTERNAL MEDICINE

## 2022-03-21 PROCEDURE — 80061 LIPID PANEL: CPT | Performed by: INTERNAL MEDICINE

## 2022-03-21 PROCEDURE — 36415 COLL VENOUS BLD VENIPUNCTURE: CPT | Mod: PO | Performed by: INTERNAL MEDICINE

## 2022-03-21 PROCEDURE — 80076 HEPATIC FUNCTION PANEL: CPT | Performed by: INTERNAL MEDICINE

## 2022-03-21 PROCEDURE — 84153 ASSAY OF PSA TOTAL: CPT | Performed by: INTERNAL MEDICINE

## 2022-03-21 PROCEDURE — 83036 HEMOGLOBIN GLYCOSYLATED A1C: CPT | Performed by: INTERNAL MEDICINE

## 2022-03-22 NOTE — PATIENT INSTRUCTIONS
We were happy to see you today    For your Testing  Please have your labs and/or imaging test done today and prior to next visit       For your Medication   For more information about side effects please visit medlineplus.gov        For your Vaccinations    Please obtain the following vaccines at the pharmacy  Shingles       Please return to clinic in    6 month with prelabs

## 2022-03-22 NOTE — PROGRESS NOTES
Subjective:       Patient ID: Jonh Richards is a 62 y.o. male.    Chief Complaint:   No chief complaint on file.      Follow-up    Diabetes        #Interim Hx           #Concerns Today        Neck, back pain   Patient reports that symptoms  started   6 weeks   prior to presentation .     Symptoms are are improving    Patient reports pain is left .     Pain is worse with Movement      They have tried Tylenol  for the pain .      Scale is 6/10    They describe the pain as sharp      Associated symptoms include cervical pain    Pain radiates distally .       Past Surgical History:   Procedure Laterality Date    COLONOSCOPY N/A 11/2/2018    Procedure: COLONOSCOPY/Suprep;  Surgeon: Mariposa Celestin MD;  Location: [facility] ENDO;  Service: Endoscopy;  Laterality: N/A;    none       Previous episodes include       Depression/Anxiety  Dx; 2020  interim hx; has been out of work, arguing more with wife and more angry  Provider: psych, therapy not currenllty following   medication: was taking sertraline , not currently taking   Comorbidity; anger?  complication: suicide attempts;none   Diet/exercise;poor   substance use;none    Patient Health Questionnaire-9 (PHQ-9)      1. Little interest or pleasure in doing things? yes,  More than half of days  = 2    2. Feeling down, depressed, or hopeless? yes, Nearly every day           = 3    3. Trouble falling or staying asleep, or sleeping too much? yes, More than half of days  = 2    4. Feeling tired or having little energy? yes, Nearly every day           = 3    5. Poor appetite or overeating? yes, Several days                = 1    6. Feeling bad about yourself- or that you are a failure or have let yourself or your family down? yes, Several days                = 1    7. Trouble concentrating on things, such as reading the newspaper or watching TV? yes, More than half of days  = 2    8. Moving or speaking so slowly that other people could have noticed? Or the opposite- being  "so fidgety or restless that you have been moving around a lot more than usual? yes, Nearly every day           = 3    9. Thoughts that you would be better off dead or of hurting yourself in some way? yes, Several days                = 1    Total Score: 18     How difficult have these problems made it for you to do your work, take care of things at home, or get along with other people? no, More than half of days  = 2            #Chronic Problems    DM assessment  Dx:; 2021  Complication; no known   Medication:adherent to  - metformin 500mg BID - NOT CURRENTLY TAKING    - hba1c -   Lab Results   Component Value Date    HGBA1C 6.3 (H) 03/21/2022    HGBA1C 6.2 (H) 06/04/2021    HGBA1C 6.6 (H) 03/08/2021      - umacr -   Lab Results   Component Value Date    MICALBCREAT 14.6 10/13/2008     Vision/Podiatry: overdue   Low BG; has equipment   Preventative:  [id number]__[id number] on, Statin      Health Maintenance Due   Topic Date Due    Foot Exam  Never done    Diabetes Urine Screening  10/13/2009    Shingles Vaccine (1 of 2) Never done    COVID-19 Vaccine (3 - Moderna risk 4-dose series) 12/03/2021       Health Maintenance Topics with due status: Not Due       Topic Last Completion Date    Pneumococcal Vaccines (Age 0-64) 10/21/2008    TETANUS VACCINE 09/26/2018    Colorectal Cancer Screening 11/02/2018    Eye Exam 08/13/2021    Low Dose Statin 10/29/2021    Lipid Panel 03/21/2022    Hemoglobin A1c 03/21/2022       Tobacco Use: Low Risk     Smoking Tobacco Use: Never Smoker    Smokeless Tobacco Use: Never Used         Review of Systems   All other systems reviewed and are negative.      Objective:   /80 (BP Location: Right arm, Patient Position: Sitting, BP Method: Medium (Manual))   Pulse (!) 55   Ht 5' 11" (1.803 m)   Wt 115.9 kg (255 lb 8.2 oz)   SpO2 99%   BMI 35.64 kg/m²       Vitals 3/23/2022 10/29/2021 7/28/2021 6/8/2021 4/27/2021   Height 71 71 71 71 71   Weight (lbs) 255.51 249.34 249.12 251.32 250   BMI " (kg/m2) 35.7 34.8 34.8 35.1 34.9            Physical Exam  Vitals and nursing note reviewed.   Constitutional:       General: He is not in acute distress.     Appearance: Normal appearance. He is well-developed. He is not ill-appearing, toxic-appearing or diaphoretic.   HENT:      Head: Normocephalic.   Eyes:      Conjunctiva/sclera: Conjunctivae normal.   Cardiovascular:      Rate and Rhythm: Normal rate and regular rhythm.      Pulses:           Dorsalis pedis pulses are 2+ on the right side and 2+ on the left side.        Posterior tibial pulses are 2+ on the right side and 2+ on the left side.      Heart sounds: Normal heart sounds. No murmur heard.    No friction rub. No gallop.   Pulmonary:      Effort: Pulmonary effort is normal. No respiratory distress.      Breath sounds: Normal breath sounds. No stridor.   Abdominal:      General: Abdomen is flat. There is no distension.      Palpations: Abdomen is soft.   Musculoskeletal:      Cervical back: Normal range of motion and neck supple.      Right foot: Normal range of motion. No deformity or bunion.      Left foot: Normal range of motion. No deformity or bunion.      Comments: Inspection; no obvious deformity, swelling, erythema  Palpation; no crepitus, warmth   ROM:   - Passive: full  - Active: full  Maneuvers; No laxity to Anterior drawer, Lachman,Posterior drawer, Valgus, varus  Negative Thessaly - Medial-lateral grind, Noble     Feet:      Right foot:      Protective Sensation: 10 sites tested. 10 sites sensed.      Skin integrity: No ulcer, blister, skin breakdown, erythema or warmth.      Left foot:      Protective Sensation: 10 sites tested. 10 sites sensed.      Skin integrity: No ulcer, blister, skin breakdown, erythema or warmth.   Neurological:      General: No focal deficit present.      Mental Status: He is alert and oriented to person, place, and time.             ASCVD  The 10-year ASCVD risk score ([place] JUAN ALBERTO Jr., et al., 2013) is: 14.1%    Values  used to calculate the score:      Age: 62 years      Sex: Male      Is Non- : Yes      Diabetic: Yes      Tobacco smoker: No      Systolic Blood Pressure: 124 mmHg      Is BP treated: No      HDL Cholesterol: 44 mg/dL      Total Cholesterol: 147 mg/dL    Basic Labs  BMP  Lab Results   Component Value Date     03/21/2022    K 4.5 03/21/2022     03/21/2022    CO2 29 03/21/2022    BUN 16 03/21/2022    CREATININE 1.2 03/21/2022    CALCIUM 9.7 03/21/2022    ANIONGAP 8 03/21/2022    ESTGFRAFRICA >60.0 03/21/2022    EGFRNONAA >60.0 03/21/2022     Lab Results   Component Value Date    ALT 18 03/21/2022    AST 16 03/21/2022    ALKPHOS 44 (L) 03/21/2022    BILITOT 0.6 03/21/2022       Lab Results   Component Value Date    TSH 0.764 02/28/2020           Lipids  Lab Results   Component Value Date    CHOL 147 03/21/2022     Lab Results   Component Value Date    HDL 44 03/21/2022     Lab Results   Component Value Date    LDLCALC 93.8 03/21/2022     Lab Results   Component Value Date    TRIG 46 03/21/2022     Lab Results   Component Value Date    CHOLHDL 29.9 03/21/2022       DM  Lab Results   Component Value Date    HGBA1C 6.3 (H) 03/21/2022       PSA  PSA, Screen (ng/mL)   Date Value   03/21/2022 3.4       Assessment:       1. Type 2 diabetes mellitus with other specified complication, without long-term current use of insulin    2. Dorsalgia, unspecified    3. Class 2 severe obesity due to excess calories with serious comorbidity and body mass index (BMI) of 35.0 to 35.9 in adult    4. Cervical radiculopathy              Plan:             Ray was seen today for neck pain, shoulder pain, low-back pain and medication refill.    Diagnoses and all orders for this visit:    Type 2 diabetes mellitus with other specified complication, without long-term current use of insulinChronic  Controlled  Patient is at goal today   I have reviewed lifestyle modification to achieve/maintain goals  We will  continue the current medication regimen as listed below  Patient will follow up in 6 months   -     Microalbumin/Creatinine Ratio, Urine; Future  -     Basic Metabolic Panel; Standing  -     Hemoglobin A1C; Standing    Dorsalgia, unspecified  -     diclofenac sodium (VOLTAREN) 1 % Gel; Apply 2 g topically 4 (four) times daily.  -     cyclobenzaprine (FLEXERIL) 10 MG tablet; Take 1 tablet (10 mg total) by mouth 3 (three) times daily as needed for Muscle spasms.    Class 2 severe obesity due to excess calories with serious comorbidity and body mass index (BMI) of 35.0 to 35.9 in adult    Cervical radiculopathy            Future Appointments   Date Time Provider Department Center   9/20/2022  8:00 AM LAB, [facility] [place]   9/23/2022  9:00 AM Jericho Chavira III, MD [facility]           Medication List with Changes/Refills   New Medications    CYCLOBENZAPRINE (FLEXERIL) 10 MG TABLET    Take 1 tablet (10 mg total) by mouth 3 (three) times daily as needed for Muscle spasms.    PANTOPRAZOLE (PROTONIX) 40 MG TABLET    Take 1 tablet (40 mg total) by mouth once daily.   Current Medications    ATORVASTATIN (LIPITOR) 10 MG TABLET    Take 1 tablet (10 mg total) by mouth once daily.    DULOXETINE (CYMBALTA) 30 MG CAPSULE    Take 1 capsule (30 mg total) by mouth once daily.   Changed and/or Refilled Medications    Modified Medication Previous Medication    DICLOFENAC SODIUM (VOLTAREN) 1 % GEL diclofenac sodium (VOLTAREN) 1 % Gel       Apply 2 g topically 4 (four) times daily.    Apply 2 g topically 4 (four) times daily.   Discontinued Medications    METFORMIN (GLUCOPHAGE) 500 MG TABLET    Take 1 tablet (500 mg total) by mouth 2 (two) times daily with meals.         Disclaimer:  This note has been generated using voice-recognition software. There may be grammatical or spelling errors that have been missed during proof-reading

## 2022-03-23 ENCOUNTER — TELEPHONE (OUTPATIENT)
Dept: INTERNAL MEDICINE | Facility: CLINIC | Age: 62
End: 2022-03-23
Payer: MEDICAID

## 2022-03-23 ENCOUNTER — OFFICE VISIT (OUTPATIENT)
Dept: INTERNAL MEDICINE | Facility: CLINIC | Age: 62
End: 2022-03-23
Payer: MEDICAID

## 2022-03-23 VITALS
BODY MASS INDEX: 35.77 KG/M2 | SYSTOLIC BLOOD PRESSURE: 120 MMHG | HEIGHT: 71 IN | OXYGEN SATURATION: 99 % | HEART RATE: 55 BPM | DIASTOLIC BLOOD PRESSURE: 80 MMHG | WEIGHT: 255.5 LBS

## 2022-03-23 DIAGNOSIS — M54.9 DORSALGIA, UNSPECIFIED: ICD-10-CM

## 2022-03-23 DIAGNOSIS — M54.12 CERVICAL RADICULOPATHY: ICD-10-CM

## 2022-03-23 DIAGNOSIS — E66.01 CLASS 2 SEVERE OBESITY DUE TO EXCESS CALORIES WITH SERIOUS COMORBIDITY AND BODY MASS INDEX (BMI) OF 35.0 TO 35.9 IN ADULT: ICD-10-CM

## 2022-03-23 DIAGNOSIS — K21.9 GASTROESOPHAGEAL REFLUX DISEASE, UNSPECIFIED WHETHER ESOPHAGITIS PRESENT: Primary | ICD-10-CM

## 2022-03-23 DIAGNOSIS — E11.69 TYPE 2 DIABETES MELLITUS WITH OTHER SPECIFIED COMPLICATION, WITHOUT LONG-TERM CURRENT USE OF INSULIN: Primary | ICD-10-CM

## 2022-03-23 PROCEDURE — 99213 OFFICE O/P EST LOW 20 MIN: CPT | Mod: PBBFAC,PO | Performed by: INTERNAL MEDICINE

## 2022-03-23 PROCEDURE — 99999 PR PBB SHADOW E&M-EST. PATIENT-LVL III: ICD-10-PCS | Mod: PBBFAC,,, | Performed by: INTERNAL MEDICINE

## 2022-03-23 PROCEDURE — 3079F DIAST BP 80-89 MM HG: CPT | Mod: CPTII,,, | Performed by: INTERNAL MEDICINE

## 2022-03-23 PROCEDURE — 3008F PR BODY MASS INDEX (BMI) DOCUMENTED: ICD-10-PCS | Mod: CPTII,,, | Performed by: INTERNAL MEDICINE

## 2022-03-23 PROCEDURE — 99214 OFFICE O/P EST MOD 30 MIN: CPT | Mod: S$PBB,,, | Performed by: INTERNAL MEDICINE

## 2022-03-23 PROCEDURE — 99214 PR OFFICE/OUTPT VISIT, EST, LEVL IV, 30-39 MIN: ICD-10-PCS | Mod: S$PBB,,, | Performed by: INTERNAL MEDICINE

## 2022-03-23 PROCEDURE — 1159F PR MEDICATION LIST DOCUMENTED IN MEDICAL RECORD: ICD-10-PCS | Mod: CPTII,,, | Performed by: INTERNAL MEDICINE

## 2022-03-23 PROCEDURE — 3061F NEG MICROALBUMINURIA REV: CPT | Mod: CPTII,,, | Performed by: INTERNAL MEDICINE

## 2022-03-23 PROCEDURE — 99999 PR PBB SHADOW E&M-EST. PATIENT-LVL III: CPT | Mod: PBBFAC,,, | Performed by: INTERNAL MEDICINE

## 2022-03-23 PROCEDURE — 3079F PR MOST RECENT DIASTOLIC BLOOD PRESSURE 80-89 MM HG: ICD-10-PCS | Mod: CPTII,,, | Performed by: INTERNAL MEDICINE

## 2022-03-23 PROCEDURE — 3044F PR MOST RECENT HEMOGLOBIN A1C LEVEL <7.0%: ICD-10-PCS | Mod: CPTII,,, | Performed by: INTERNAL MEDICINE

## 2022-03-23 PROCEDURE — 3044F HG A1C LEVEL LT 7.0%: CPT | Mod: CPTII,,, | Performed by: INTERNAL MEDICINE

## 2022-03-23 PROCEDURE — 3066F NEPHROPATHY DOC TX: CPT | Mod: CPTII,,, | Performed by: INTERNAL MEDICINE

## 2022-03-23 PROCEDURE — 1159F MED LIST DOCD IN RCRD: CPT | Mod: CPTII,,, | Performed by: INTERNAL MEDICINE

## 2022-03-23 PROCEDURE — 3008F BODY MASS INDEX DOCD: CPT | Mod: CPTII,,, | Performed by: INTERNAL MEDICINE

## 2022-03-23 PROCEDURE — 3061F PR NEG MICROALBUMINURIA RESULT DOCUMENTED/REVIEW: ICD-10-PCS | Mod: CPTII,,, | Performed by: INTERNAL MEDICINE

## 2022-03-23 PROCEDURE — 3074F SYST BP LT 130 MM HG: CPT | Mod: CPTII,,, | Performed by: INTERNAL MEDICINE

## 2022-03-23 PROCEDURE — 3066F PR DOCUMENTATION OF TREATMENT FOR NEPHROPATHY: ICD-10-PCS | Mod: CPTII,,, | Performed by: INTERNAL MEDICINE

## 2022-03-23 PROCEDURE — 96127 BRIEF EMOTIONAL/BEHAV ASSMT: CPT | Mod: PBBFAC,PO | Performed by: INTERNAL MEDICINE

## 2022-03-23 PROCEDURE — 3074F PR MOST RECENT SYSTOLIC BLOOD PRESSURE < 130 MM HG: ICD-10-PCS | Mod: CPTII,,, | Performed by: INTERNAL MEDICINE

## 2022-03-23 RX ORDER — PANTOPRAZOLE SODIUM 40 MG/1
40 TABLET, DELAYED RELEASE ORAL DAILY
Qty: 90 TABLET | Refills: 1 | Status: SHIPPED | OUTPATIENT
Start: 2022-03-23 | End: 2022-12-20

## 2022-03-23 RX ORDER — CYCLOBENZAPRINE HCL 10 MG
10 TABLET ORAL 3 TIMES DAILY PRN
Qty: 15 TABLET | Refills: 0 | Status: SHIPPED | OUTPATIENT
Start: 2022-03-23 | End: 2022-04-02

## 2022-03-23 RX ORDER — DICLOFENAC SODIUM 10 MG/G
2 GEL TOPICAL 4 TIMES DAILY
Qty: 100 G | Refills: 0 | Status: SHIPPED | OUTPATIENT
Start: 2022-03-23 | End: 2022-12-20

## 2022-03-23 NOTE — TELEPHONE ENCOUNTER
----- Message from Suzi Dan sent at 3/23/2022  9:35 AM CDT -----  Type:  Needs Medical Advice    Who Called: pt  Symptoms (please be specific): Acid reflux pt was seen today and he forgot to mention this and would like to get a return call back   How long has patient had these symptoms:  1 month  Pharmacy name and phone #:  [id number] - [place], [place] - [address]   Phone: [phone]  Fax:  [phone]        Would the patient rather a call back or a response via [facility]? call  Best Call Back Number: [phone]  Additional Information: if something could be sent in that would be Great....

## 2022-08-01 ENCOUNTER — TELEPHONE (OUTPATIENT)
Dept: INTERNAL MEDICINE | Facility: CLINIC | Age: 62
End: 2022-08-01
Payer: MEDICAID

## 2022-08-01 NOTE — TELEPHONE ENCOUNTER
----- Message from Luisana Dotson sent at 8/1/2022 12:47 PM CDT -----  Regarding: sooner appt  Type:  Sooner Apoointment Request    Caller is requesting a sooner appointment.  Caller declined first available appointment listed below.  Caller will not accept being placed on the waitlist and is requesting a message be sent to doctor.  Name of Caller:patient     When is the first available appointment?September     Symptoms:eye pain     Would the patient rather a call back or a response via [facility]? Call back     Best Call Back Number:[phone]  Additional Information: n/a

## 2022-08-04 ENCOUNTER — OFFICE VISIT (OUTPATIENT)
Dept: INTERNAL MEDICINE | Facility: CLINIC | Age: 62
End: 2022-08-04
Payer: MEDICAID

## 2022-08-04 ENCOUNTER — OFFICE VISIT (OUTPATIENT)
Dept: OPTOMETRY | Facility: CLINIC | Age: 62
End: 2022-08-04
Payer: MEDICAID

## 2022-08-04 VITALS
DIASTOLIC BLOOD PRESSURE: 76 MMHG | WEIGHT: 248.25 LBS | SYSTOLIC BLOOD PRESSURE: 136 MMHG | OXYGEN SATURATION: 98 % | BODY MASS INDEX: 34.75 KG/M2 | HEART RATE: 66 BPM | HEIGHT: 71 IN

## 2022-08-04 DIAGNOSIS — H57.10 PAIN IN EYE, UNSPECIFIED LATERALITY: Primary | ICD-10-CM

## 2022-08-04 DIAGNOSIS — H16.222: Primary | ICD-10-CM

## 2022-08-04 DIAGNOSIS — H57.10 PAIN IN EYE, UNSPECIFIED LATERALITY: ICD-10-CM

## 2022-08-04 PROCEDURE — 99999 PR PBB SHADOW E&M-EST. PATIENT-LVL II: CPT | Mod: PBBFAC,,, | Performed by: OPTOMETRIST

## 2022-08-04 PROCEDURE — 3066F NEPHROPATHY DOC TX: CPT | Mod: CPTII,,, | Performed by: INTERNAL MEDICINE

## 2022-08-04 PROCEDURE — 3008F PR BODY MASS INDEX (BMI) DOCUMENTED: ICD-10-PCS | Mod: CPTII,,, | Performed by: INTERNAL MEDICINE

## 2022-08-04 PROCEDURE — 1159F PR MEDICATION LIST DOCUMENTED IN MEDICAL RECORD: ICD-10-PCS | Mod: CPTII,,, | Performed by: OPTOMETRIST

## 2022-08-04 PROCEDURE — 1160F RVW MEDS BY RX/DR IN RCRD: CPT | Mod: CPTII,,, | Performed by: INTERNAL MEDICINE

## 2022-08-04 PROCEDURE — 92002 INTRM OPH EXAM NEW PATIENT: CPT | Mod: S$PBB,,, | Performed by: OPTOMETRIST

## 2022-08-04 PROCEDURE — 3044F HG A1C LEVEL LT 7.0%: CPT | Mod: CPTII,,, | Performed by: INTERNAL MEDICINE

## 2022-08-04 PROCEDURE — 1159F MED LIST DOCD IN RCRD: CPT | Mod: CPTII,,, | Performed by: INTERNAL MEDICINE

## 2022-08-04 PROCEDURE — 1159F MED LIST DOCD IN RCRD: CPT | Mod: CPTII,,, | Performed by: OPTOMETRIST

## 2022-08-04 PROCEDURE — 3061F PR NEG MICROALBUMINURIA RESULT DOCUMENTED/REVIEW: ICD-10-PCS | Mod: CPTII,,, | Performed by: OPTOMETRIST

## 2022-08-04 PROCEDURE — 92002 PR EYE EXAM, NEW PATIENT,INTERMED: ICD-10-PCS | Mod: S$PBB,,, | Performed by: OPTOMETRIST

## 2022-08-04 PROCEDURE — 99214 OFFICE O/P EST MOD 30 MIN: CPT | Mod: PBBFAC,PO | Performed by: INTERNAL MEDICINE

## 2022-08-04 PROCEDURE — 3061F NEG MICROALBUMINURIA REV: CPT | Mod: CPTII,,, | Performed by: OPTOMETRIST

## 2022-08-04 PROCEDURE — 3066F PR DOCUMENTATION OF TREATMENT FOR NEPHROPATHY: ICD-10-PCS | Mod: CPTII,,, | Performed by: OPTOMETRIST

## 2022-08-04 PROCEDURE — 99213 OFFICE O/P EST LOW 20 MIN: CPT | Mod: S$PBB,,, | Performed by: INTERNAL MEDICINE

## 2022-08-04 PROCEDURE — 3075F PR MOST RECENT SYSTOLIC BLOOD PRESS GE 130-139MM HG: ICD-10-PCS | Mod: CPTII,,, | Performed by: INTERNAL MEDICINE

## 2022-08-04 PROCEDURE — 99999 PR PBB SHADOW E&M-EST. PATIENT-LVL IV: CPT | Mod: PBBFAC,,, | Performed by: INTERNAL MEDICINE

## 2022-08-04 PROCEDURE — 99212 OFFICE O/P EST SF 10 MIN: CPT | Mod: PBBFAC,27,PN | Performed by: OPTOMETRIST

## 2022-08-04 PROCEDURE — 3044F PR MOST RECENT HEMOGLOBIN A1C LEVEL <7.0%: ICD-10-PCS | Mod: CPTII,,, | Performed by: INTERNAL MEDICINE

## 2022-08-04 PROCEDURE — 99999 PR PBB SHADOW E&M-EST. PATIENT-LVL IV: ICD-10-PCS | Mod: PBBFAC,,, | Performed by: INTERNAL MEDICINE

## 2022-08-04 PROCEDURE — 3078F DIAST BP <80 MM HG: CPT | Mod: CPTII,,, | Performed by: INTERNAL MEDICINE

## 2022-08-04 PROCEDURE — 3044F PR MOST RECENT HEMOGLOBIN A1C LEVEL <7.0%: ICD-10-PCS | Mod: CPTII,,, | Performed by: OPTOMETRIST

## 2022-08-04 PROCEDURE — 3066F PR DOCUMENTATION OF TREATMENT FOR NEPHROPATHY: ICD-10-PCS | Mod: CPTII,,, | Performed by: INTERNAL MEDICINE

## 2022-08-04 PROCEDURE — 3075F SYST BP GE 130 - 139MM HG: CPT | Mod: CPTII,,, | Performed by: INTERNAL MEDICINE

## 2022-08-04 PROCEDURE — 3061F PR NEG MICROALBUMINURIA RESULT DOCUMENTED/REVIEW: ICD-10-PCS | Mod: CPTII,,, | Performed by: INTERNAL MEDICINE

## 2022-08-04 PROCEDURE — 3008F BODY MASS INDEX DOCD: CPT | Mod: CPTII,,, | Performed by: INTERNAL MEDICINE

## 2022-08-04 PROCEDURE — 3066F NEPHROPATHY DOC TX: CPT | Mod: CPTII,,, | Performed by: OPTOMETRIST

## 2022-08-04 PROCEDURE — 3061F NEG MICROALBUMINURIA REV: CPT | Mod: CPTII,,, | Performed by: INTERNAL MEDICINE

## 2022-08-04 PROCEDURE — 99999 PR PBB SHADOW E&M-EST. PATIENT-LVL II: ICD-10-PCS | Mod: PBBFAC,,, | Performed by: OPTOMETRIST

## 2022-08-04 PROCEDURE — 99213 PR OFFICE/OUTPT VISIT, EST, LEVL III, 20-29 MIN: ICD-10-PCS | Mod: S$PBB,,, | Performed by: INTERNAL MEDICINE

## 2022-08-04 PROCEDURE — 3044F HG A1C LEVEL LT 7.0%: CPT | Mod: CPTII,,, | Performed by: OPTOMETRIST

## 2022-08-04 PROCEDURE — 3078F PR MOST RECENT DIASTOLIC BLOOD PRESSURE < 80 MM HG: ICD-10-PCS | Mod: CPTII,,, | Performed by: INTERNAL MEDICINE

## 2022-08-04 PROCEDURE — 1160F PR REVIEW ALL MEDS BY PRESCRIBER/CLIN PHARMACIST DOCUMENTED: ICD-10-PCS | Mod: CPTII,,, | Performed by: INTERNAL MEDICINE

## 2022-08-04 PROCEDURE — 1159F PR MEDICATION LIST DOCUMENTED IN MEDICAL RECORD: ICD-10-PCS | Mod: CPTII,,, | Performed by: INTERNAL MEDICINE

## 2022-08-04 RX ORDER — NEOMYCIN SULFATE, POLYMYXIN B SULFATE AND DEXAMETHASONE 3.5; 10000; 1 MG/ML; [USP'U]/ML; MG/ML
1 SUSPENSION/ DROPS OPHTHALMIC 4 TIMES DAILY
Qty: 5 ML | Refills: 0 | Status: SHIPPED | OUTPATIENT
Start: 2022-08-04 | End: 2022-08-11

## 2022-08-04 NOTE — PROGRESS NOTES
HPI     Pt is here today for ocular concerns due to eye pain OS.    He states last Thursday he was cutting grass and he was wearing safety   glasses. A few days later he noticed the eye was red and he began to feel   some pain. He describes the pain as a dull ache. Currently the pain is a 2   on the pain scale. The pain is more in the morning and gets better during   the day. It is much better than it was initially.      Last edited by Em Myers, OD on 8/4/2022  4:58 PM. (History)            Assessment /Plan     For exam results, see Encounter Report.    Keratoconjunctivitis sicca not specified as Sjogren's, left  -     neomycin-polymyxin-dexamethasone (MAXITROL) 3.5mg/mL-10,000 unit/mL-0.1 % DrpS; Place 1 drop into the left eye 4 (four) times daily. for 7 days  Dispense: 5 mL; Refill: 0    Pain in eye, unspecified laterality  -     Ambulatory referral/consult to Ophthalmology      Educated pt on findings. Conj injection and significant SPK noted OS. No foreign body noted. Rx maxitrol 1 gtt OS QID for 1 week then d/c. Presrvative free ATs prn. If symptoms worsen or dont improve, RTC. Monitor 1-2 weeks.       RTC in 1-2 week for f/u or sooner with any worsening.

## 2022-08-04 NOTE — PROGRESS NOTES
Assessment:       1. Pain in eye, unspecified laterality        Plan:         Jonh was seen today for eye pain.    Diagnoses and all orders for this visit:    Pain in eye, unspecified laterality  Suspect corneal abrasion  Some chemosis   Will set u pwith optho   reviewed signs and symptoms that should prompt return to provider or evaluation in the ED  -     Ambulatory referral/consult to Ophthalmology; Future  -     Ambulatory referral/consult to Ophthalmology; Future          Subjective:       Patient ID: Jonh Richards is a 62 y.o. male.    Chief Complaint: No chief complaint on file.        Eye Problem   The left eye is affected. This is a new problem. The current episode started in the past 7 days (reports cuttyiing grass last thursday - ever since then having pain and redness in johny left eye, with blurry vision ). The problem has been gradually worsening. The injury mechanism was a foreign body. The pain is moderate. There is no known exposure to pink eye. He does not wear contacts. Associated symptoms include blurred vision, an eye discharge, eye redness, a foreign body sensation and photophobia. Pertinent negatives include no double vision, fever, itching, nausea, recent URI or vomiting.       Review of Systems   Constitutional: Negative for fever.   Eyes: Positive for blurred vision, photophobia, discharge and redness. Negative for double vision and itching.   Gastrointestinal: Negative for nausea and vomiting.             Health Maintenance Due   Topic Date Due    Pneumococcal Vaccines (Age 0-64) (2 - PCV) 10/21/2009    Shingles Vaccine (1 of 2) Never done    COVID-19 Vaccine (3 - Moderna risk series) 12/03/2021    Eye Exam  08/13/2022         Objective:     There were no vitals taken for this visit.    Vitals 3/23/2022 10/29/2021 7/28/2021 6/8/2021 4/27/2021   Height 71 71 71 71 71   Weight (lbs) 255.51 249.34 249.12 251.32 250   BMI (kg/m2) 35.7 34.8 34.8 35.1 34.9            Physical Exam  Nursing  note reviewed.   Constitutional:       General: He is not in acute distress.     Appearance: Normal appearance. He is not ill-appearing, toxic-appearing or diaphoretic.   HENT:      Head: Normocephalic.   Eyes:      General:         Left eye: Discharge present.     Conjunctiva/sclera: Conjunctivae normal.      Comments: Chemosis and left circumfrenetial erythema of conjunctivea   Pulmonary:      Effort: Pulmonary effort is normal. No respiratory distress.   Neurological:      General: No focal deficit present.      Mental Status: He is alert and oriented to person, place, and time.   Psychiatric:         Mood and Affect: Mood normal.         Behavior: Behavior normal.         Thought Content: Thought content normal.         Judgment: Judgment normal.             Future Appointments   Date Time Provider Department Center   8/4/2022  2:40 PM Em Myers OD [facility] [place]   9/20/2022  8:00 AM LAB, [facility] [place]   9/23/2022  9:00 AM Jericho Chavira III, MD [facility]         Medication List with Changes/Refills   Current Medications    ATORVASTATIN (LIPITOR) 10 MG TABLET    Take 1 tablet (10 mg total) by mouth once daily.    DICLOFENAC SODIUM (VOLTAREN) 1 % GEL    Apply 2 g topically 4 (four) times daily.    DULOXETINE (CYMBALTA) 30 MG CAPSULE    Take 1 capsule (30 mg total) by mouth once daily.    PANTOPRAZOLE (PROTONIX) 40 MG TABLET    Take 1 tablet (40 mg total) by mouth once daily.         Disclaimer:  This note has been generated using voice-recognition software. There may be grammatical or spelling errors that have been missed during proof-reading

## 2022-09-20 ENCOUNTER — TELEPHONE (OUTPATIENT)
Dept: INTERNAL MEDICINE | Facility: CLINIC | Age: 62
End: 2022-09-20
Payer: MEDICAID

## 2022-09-20 ENCOUNTER — LAB VISIT (OUTPATIENT)
Dept: LAB | Facility: HOSPITAL | Age: 62
End: 2022-09-20
Attending: INTERNAL MEDICINE
Payer: MEDICAID

## 2022-09-20 DIAGNOSIS — E11.69 TYPE 2 DIABETES MELLITUS WITH OTHER SPECIFIED COMPLICATION, WITHOUT LONG-TERM CURRENT USE OF INSULIN: ICD-10-CM

## 2022-09-20 LAB
ANION GAP SERPL CALC-SCNC: 7 MMOL/L (ref 8–16)
BUN SERPL-MCNC: 18 MG/DL (ref 8–23)
CALCIUM SERPL-MCNC: 9.1 MG/DL (ref 8.7–10.5)
CHLORIDE SERPL-SCNC: 104 MMOL/L (ref 95–110)
CO2 SERPL-SCNC: 26 MMOL/L (ref 23–29)
CREAT SERPL-MCNC: 1.2 MG/DL (ref 0.5–1.4)
EST. GFR  (NO RACE VARIABLE): >60 ML/MIN/1.73 M^2
ESTIMATED AVG GLUCOSE: 137 MG/DL (ref 68–131)
GLUCOSE SERPL-MCNC: 129 MG/DL (ref 70–110)
HBA1C MFR BLD: 6.4 % (ref 4–5.6)
POTASSIUM SERPL-SCNC: 4.3 MMOL/L (ref 3.5–5.1)
SODIUM SERPL-SCNC: 137 MMOL/L (ref 136–145)

## 2022-09-20 PROCEDURE — 83036 HEMOGLOBIN GLYCOSYLATED A1C: CPT | Performed by: INTERNAL MEDICINE

## 2022-09-20 PROCEDURE — 36415 COLL VENOUS BLD VENIPUNCTURE: CPT | Mod: PO | Performed by: INTERNAL MEDICINE

## 2022-09-20 PROCEDURE — 80048 BASIC METABOLIC PNL TOTAL CA: CPT | Performed by: INTERNAL MEDICINE

## 2022-09-29 ENCOUNTER — TELEPHONE (OUTPATIENT)
Dept: INTERNAL MEDICINE | Facility: CLINIC | Age: 62
End: 2022-09-29
Payer: MEDICAID

## 2022-09-29 NOTE — TELEPHONE ENCOUNTER
----- Message from Jericho Chavira III, MD sent at 9/26/2022 10:03 PM CDT -----  Regarding: rescheduled appt  Had appt for 9/23 but need to reschedule -I was out   ----- Message -----  From: Janes, [employer] Lab Interface  Sent: 9/20/2022  12:38 PM CDT  To: Jericho Chavira III, MD

## 2022-11-16 ENCOUNTER — PATIENT MESSAGE (OUTPATIENT)
Dept: INTERNAL MEDICINE | Facility: CLINIC | Age: 62
End: 2022-11-16

## 2022-12-05 ENCOUNTER — TELEPHONE (OUTPATIENT)
Dept: INTERNAL MEDICINE | Facility: CLINIC | Age: 62
End: 2022-12-05
Payer: MEDICAID

## 2022-12-05 NOTE — TELEPHONE ENCOUNTER
----- Message from Jericho Chavira III, MD sent at 11/25/2022  1:13 PM CST -----  Hi  Patient has labs done , but no follow up visit? Can we get them scheduled for the follow up

## 2022-12-20 ENCOUNTER — OFFICE VISIT (OUTPATIENT)
Dept: INTERNAL MEDICINE | Facility: CLINIC | Age: 62
End: 2022-12-20
Payer: MEDICAID

## 2022-12-20 DIAGNOSIS — E11.69 TYPE 2 DIABETES MELLITUS WITH OTHER SPECIFIED COMPLICATION, WITHOUT LONG-TERM CURRENT USE OF INSULIN: Primary | ICD-10-CM

## 2022-12-20 PROCEDURE — 3066F PR DOCUMENTATION OF TREATMENT FOR NEPHROPATHY: ICD-10-PCS | Mod: CPTII,95,, | Performed by: INTERNAL MEDICINE

## 2022-12-20 PROCEDURE — 3061F PR NEG MICROALBUMINURIA RESULT DOCUMENTED/REVIEW: ICD-10-PCS | Mod: CPTII,95,, | Performed by: INTERNAL MEDICINE

## 2022-12-20 PROCEDURE — 99213 PR OFFICE/OUTPT VISIT, EST, LEVL III, 20-29 MIN: ICD-10-PCS | Mod: 95,,, | Performed by: INTERNAL MEDICINE

## 2022-12-20 PROCEDURE — 99213 OFFICE O/P EST LOW 20 MIN: CPT | Mod: 95,,, | Performed by: INTERNAL MEDICINE

## 2022-12-20 PROCEDURE — 1159F PR MEDICATION LIST DOCUMENTED IN MEDICAL RECORD: ICD-10-PCS | Mod: CPTII,95,, | Performed by: INTERNAL MEDICINE

## 2022-12-20 PROCEDURE — 1159F MED LIST DOCD IN RCRD: CPT | Mod: CPTII,95,, | Performed by: INTERNAL MEDICINE

## 2022-12-20 PROCEDURE — 1160F PR REVIEW ALL MEDS BY PRESCRIBER/CLIN PHARMACIST DOCUMENTED: ICD-10-PCS | Mod: CPTII,95,, | Performed by: INTERNAL MEDICINE

## 2022-12-20 PROCEDURE — 3066F NEPHROPATHY DOC TX: CPT | Mod: CPTII,95,, | Performed by: INTERNAL MEDICINE

## 2022-12-20 PROCEDURE — 3044F HG A1C LEVEL LT 7.0%: CPT | Mod: CPTII,95,, | Performed by: INTERNAL MEDICINE

## 2022-12-20 PROCEDURE — 1160F RVW MEDS BY RX/DR IN RCRD: CPT | Mod: CPTII,95,, | Performed by: INTERNAL MEDICINE

## 2022-12-20 PROCEDURE — 3044F PR MOST RECENT HEMOGLOBIN A1C LEVEL <7.0%: ICD-10-PCS | Mod: CPTII,95,, | Performed by: INTERNAL MEDICINE

## 2022-12-20 PROCEDURE — 3061F NEG MICROALBUMINURIA REV: CPT | Mod: CPTII,95,, | Performed by: INTERNAL MEDICINE

## 2022-12-20 NOTE — PROGRESS NOTES
Established Patient - Audio Only Telehealth Visit     The patient location is: [facility]  The chief complaint leading to consultation is: labs  Visit type: Virtual visit with audio only (telephone)  Total time spent with patient: 15       The reason for the audio only service rather than synchronous audio and video virtual visit was related to technical difficulties or patient preference/necessity.     Each patient to whom I provide medical services by telemedicine is:  (1) informed of the relationship between the physician and patient and the respective role of any other health care provider with respect to management of the patient; and (2) notified that they may decline to receive medical services by telemedicine and may withdraw from such care at any time. Patient verbally consented to receive this service via voice-only telephone call.       HPI: missed last appt has been helping sister in [place]      Assessment and plan:  1. Type 2 diabetes mellitus with other specified complication, without long-term current use of insulin  Chronic  Controlled  Patient is at goal today   I have reviewed lifestyle modification to achieve/maintain goals  We will continue the current medication regimen as listed below  Patient will follow up in 3 months   - Basic Metabolic Panel; Standing  - Hemoglobin A1C; Standing  - Hepatic Function Panel; Standing  - Lipid Panel; Standing     NOT on any meds  Will stop energy drinks  Fu in 3 months           Basic Labs    BMP  Lab Results   Component Value Date     09/20/2022    K 4.3 09/20/2022     09/20/2022    CO2 26 09/20/2022    BUN 18 09/20/2022    CREATININE 1.2 09/20/2022    CALCIUM 9.1 09/20/2022    ANIONGAP 7 (L) 09/20/2022    ESTGFRAFRICA >60.0 03/21/2022    EGFRNONAA >60.0 03/21/2022     Lab Results   Component Value Date    EGFRNORACEVR >60.0 09/20/2022       Lab Results   Component Value Date    ALT 18 03/21/2022    AST 16 03/21/2022    ALKPHOS 44 (L)  03/21/2022    BILITOT 0.6 03/21/2022                 Lipids  Lab Results   Component Value Date    CHOL 147 03/21/2022     Lab Results   Component Value Date    HDL 44 03/21/2022     Lab Results   Component Value Date    LDLCALC 93.8 03/21/2022     Lab Results   Component Value Date    TRIG 46 03/21/2022     Lab Results   Component Value Date    CHOLHDL 29.9 03/21/2022       DM  Lab Results   Component Value Date    HGBA1C 6.4 (H) 09/20/2022         No future appointments.      Medication List with Changes/Refills   Current Medications    ATORVASTATIN (LIPITOR) 10 MG TABLET    Take 1 tablet (10 mg total) by mouth once daily.    DICLOFENAC SODIUM (VOLTAREN) 1 % GEL    Apply 2 g topically 4 (four) times daily.    DULOXETINE (CYMBALTA) 30 MG CAPSULE    Take 1 capsule (30 mg total) by mouth once daily.    PANTOPRAZOLE (PROTONIX) 40 MG TABLET    Take 1 tablet (40 mg total) by mouth once daily.         Disclaimer:  This note has been generated using voice-recognition software. There may be grammatical or spelling errors that have been missed during proof-reading        This service was not originating from a related E/M service provided within the previous 7 days nor will  to an E/M service or procedure within the next 24 hours or my soonest available appointment.  Prevailing standard of care was able to be met in this audio-only visit.

## 2023-03-17 ENCOUNTER — PATIENT MESSAGE (OUTPATIENT)
Dept: ADMINISTRATIVE | Facility: HOSPITAL | Age: 63
End: 2023-03-17
Payer: MEDICAID

## 2023-03-17 ENCOUNTER — PATIENT OUTREACH (OUTPATIENT)
Dept: ADMINISTRATIVE | Facility: HOSPITAL | Age: 63
End: 2023-03-17
Payer: MEDICAID

## 2023-03-17 DIAGNOSIS — E11.9 TYPE 2 DIABETES MELLITUS WITHOUT COMPLICATION, UNSPECIFIED WHETHER LONG TERM INSULIN USE: Primary | ICD-10-CM

## 2023-03-17 NOTE — PROGRESS NOTES
2023 Care Everywhere updates requested and reviewed.  Immunizations reconciled. Media reports reviewed.  Duplicate HM overrides and  orders removed.  Overdue HM topic chart audit and/or requested.  Overdue lab testing linked to upcoming lab appointments if applies.  Lab leoncio, and [employer] reviewed   Portal outreached regarding Health maintenance     Health Maintenance Due   Topic Date Due    Low Dose Statin  Never done    Pneumococcal Vaccines (Age 0-64) (2 - PCV) 10/21/2009    Shingles Vaccine (1 of 2) Never done    COVID-19 Vaccine (3 - Moderna risk series) 2021    Influenza Vaccine (1) 2022    Hemoglobin A1c  2023    Diabetes Urine Screening  2023    Foot Exam  2023

## 2023-03-29 ENCOUNTER — TELEPHONE (OUTPATIENT)
Dept: INTERNAL MEDICINE | Facility: CLINIC | Age: 63
End: 2023-03-29
Payer: MEDICAID

## 2023-03-29 NOTE — TELEPHONE ENCOUNTER
Provider will no longer be in town on 03/31/2023 due to family emergency. Appointment cancled. Left voicemail. Awaiting a call back.

## 2023-04-11 ENCOUNTER — PATIENT MESSAGE (OUTPATIENT)
Dept: ADMINISTRATIVE | Facility: HOSPITAL | Age: 63
End: 2023-04-11
Payer: MEDICAID

## 2023-05-11 ENCOUNTER — OFFICE VISIT (OUTPATIENT)
Dept: UROLOGY | Facility: CLINIC | Age: 63
End: 2023-05-11
Payer: MEDICARE

## 2023-05-11 VITALS
SYSTOLIC BLOOD PRESSURE: 140 MMHG | BODY MASS INDEX: 34.91 KG/M2 | DIASTOLIC BLOOD PRESSURE: 93 MMHG | HEART RATE: 64 BPM | WEIGHT: 249.38 LBS | HEIGHT: 71 IN

## 2023-05-11 DIAGNOSIS — N52.8 OTHER MALE ERECTILE DYSFUNCTION: ICD-10-CM

## 2023-05-11 DIAGNOSIS — Z12.5 PROSTATE CANCER SCREENING: Primary | ICD-10-CM

## 2023-05-11 PROCEDURE — 99212 OFFICE O/P EST SF 10 MIN: CPT | Mod: PBBFAC,PO | Performed by: UROLOGY

## 2023-05-11 PROCEDURE — 99999 PR PBB SHADOW E&M-EST. PATIENT-LVL II: CPT | Mod: PBBFAC,,, | Performed by: UROLOGY

## 2023-05-11 PROCEDURE — 99204 OFFICE O/P NEW MOD 45 MIN: CPT | Mod: S$PBB,,, | Performed by: UROLOGY

## 2023-05-11 PROCEDURE — 99204 PR OFFICE/OUTPT VISIT, NEW, LEVL IV, 45-59 MIN: ICD-10-PCS | Mod: S$PBB,,, | Performed by: UROLOGY

## 2023-05-11 PROCEDURE — 99999 PR PBB SHADOW E&M-EST. PATIENT-LVL II: ICD-10-PCS | Mod: PBBFAC,,, | Performed by: UROLOGY

## 2023-05-11 NOTE — PROGRESS NOTES
"[facility] - Urology   Clinic Note    SUBJECTIVE:     Chief Complaint   Patient presents with    Other     Penile Implant        Referral from: Self, [facility].    History of Present Illness:  Jonh Richards is a 63 y.o. male who presents to clinic for erectile dysfunction.    Patient here with complaints of erectile dysfunction.  He endorses difficulty getting and maintaining erections.  He is not able to reliably achieve a strong enough erection for intercourse. Has tried PO therapies, it does not provide much relief.    Previous ED treatment: yes, Viagra and Cialis.    Patient endorses no additional complaints at this time.    Past Medical History:   Diagnosis Date    Family history of prostate cancer 4/27/2021    Hyperglycemia     pre diabetes  , diet controlled.  never on meds    Type 2 diabetes mellitus without complication, without long-term current use of insulin 4/27/2021       Past Surgical History:   Procedure Laterality Date    COLONOSCOPY N/A 11/2/2018    Procedure: COLONOSCOPY/Suprep;  Surgeon: Mariposa Celestin MD;  Location: [facility];  Service: Endoscopy;  Laterality: N/A;    none         Family History   Problem Relation Age of Onset    Heart disease Father     Prostate cancer Father        Social History     Tobacco Use    Smoking status: Never    Smokeless tobacco: Never   Substance Use Topics    Alcohol use: No       No current outpatient medications on file prior to visit.     No current facility-administered medications on file prior to visit.       Review of patient's allergies indicates:  No Known Allergies    Review of Systems:  A review of 10+ systems was conducted with pertinent positive and negative findings documented in HPI with all other systems reviewed and negative.    OBJECTIVE:     Estimated body mass index is 34.78 kg/m² as calculated from the following:    Height as of this encounter: 5' 11" (1.803 m).    Weight as of this encounter: 113.1 kg (249 lb 6.4 oz).    Vital Signs " (Most Recent)  Vitals:    05/11/23 1033   BP: (!) 140/93   Pulse: 64       Physical Exam:  GENERAL: patient sitting comfortably  HEENT: normocephalic  NECK: supple, no JVD  PULM: normal chest rise, no increased WOB  HEART: non-diaphoretic  ABDO: soft, nondistended, nontender  BACK: no CVA tenderness bilaterally  SKIN: warm, dry, well perfused  EXT: no bruising or edema  NEURO: grossly normal with no focal deficits  PSYCH: appropriate mood and affect    Genitourinary Exam:  deferred    Lab Results   Component Value Date    BUN 18 09/20/2022    CREATININE 1.2 09/20/2022    WBC 4.06 11/22/2016    HGB 15.1 11/22/2016    HCT 45.7 11/22/2016     11/22/2016    AST 16 03/21/2022    ALT 18 03/21/2022    ALKPHOS 44 (L) 03/21/2022    ALBUMIN 3.5 03/21/2022    HGBA1C 6.4 (H) 09/20/2022        Imaging:   I have personally reviewed all relevant imaging studies.    No results found for this or any previous visit (from the past 2160 hour(s)).  No results found for this or any previous visit (from the past 2160 hour(s)).  X-Ray Knee 3 View Left  EXAMINATION:  XR KNEE 3 VIEW LEFT    CLINICAL HISTORY:  Pain in left knee    FINDINGS:  Three views left knee: No fracture dislocation bone destruction seen.    Electronically signed by: Stone Stokes MD  Date:    07/28/2021  Time:    09:50  X-Ray Knee AP Standing Bilateral  EXAMINATION:  XR KNEE AP STANDING BILATERAL    CLINICAL HISTORY:  Pain in left knee    FINDINGS:  Knees AP standing.    Right: No fracture dislocation bone destruction seen.    Left: No fracture dislocation bone destruction seen.    Electronically signed by: Stone Stokes MD  Date:    07/28/2021  Time:    09:45       PSA:  Lab Results   Component Value Date    PSA 3.4 03/21/2022    PSA 2.2 11/22/2016    PSA 2.5 08/08/2014    PSA 1.7 10/13/2008       Testosterone:  Lab Results   Component Value Date    TESTOSTERONE 541 08/14/2014    TESTOSTERONE 62.1 08/14/2014        ASSESSMENT     1. Prostate cancer screening     2. Other male erectile dysfunction        PLAN:     Erectile Dysfunction    - We discussed the etiology and management of ED, including organic and psychogenic causes. First line therapy involves treatment with PDE-5 inhibitors.    - We discussed the risks and benefits of treatment with PDE5i's. Treatment with CLIFFORD was also briefly discussed.     - Next line therapies would include intraurethral suppository (125-1000 ug), then intracavernosal injections (1 ml trimix papaverine 30 mg, phentolamine 1 mg, prostaglandin E1 10 ug). Ultimately, a penile prosthesis would be the final option.     - We will initate trimix (Tri-Mix - PGE (alprostadil) 10mcg, papavarine 30mg, phentolamine 1mg; dispense 5mL vial, typical starting is 0.2 mL which is equal to 20 units)    2. Prostate Cancer Screening    - Annual PSA/GLORIA with PCP    Kamaljit Lovett MD  Urology  [facility]    Disclaimer: This note has been generated using voice-recognition software. There may be typographical errors that have been missed during proof-reading.

## 2023-06-18 ENCOUNTER — PATIENT MESSAGE (OUTPATIENT)
Dept: ADMINISTRATIVE | Facility: HOSPITAL | Age: 63
End: 2023-06-18
Payer: MEDICARE

## 2023-09-10 NOTE — PROGRESS NOTES
Assessment:       1. Type 2 diabetes mellitus without complication, without long-term current use of insulin    2. Current moderate episode of major depressive disorder without prior episode    3. Family history of prostate cancer    4. Type 2 diabetes mellitus with other specified complication, without long-term current use of insulin    5. Class 2 severe obesity due to excess calories with serious comorbidity and body mass index (BMI) of 35.0 to 35.9 in adult    6. Erectile dysfunction, unspecified erectile dysfunction type        Plan:         Ray was seen today for follow-up.    Diagnoses and all orders for this visit:    Type 2 diabetes mellitus without complication, without long-term current use of insulin  Chronic  Uncontrolled; unclear     I have reviewed lifestyle modification to achieve/maintain goals  Check labs   Patient will follow up in 4 weeks  -     Basic Metabolic Panel; Future  -     Hemoglobin A1C; Future  -     Hepatic Function Panel; Future  -     Microalbumin/Creatinine Ratio, Urine; Future  -     Lipid Panel; Future  -     Diabetes Digital Medicine (DDMP) Enrollment Order; Future  -     Ambulatory referral/consult to Ophthalmology; Future    Current moderate episode of major depressive disorder without prior episode  Chronic  Uncontrolled  Patient is not at goal today  I have reviewed lifestyle modification to achieve/maintain goals  We will adjust the current medication regimen to   Patient will follow up in 4 weeks  -     sertraline (ZOLOFT) 25 MG tablet; Take 1 tablet (25 mg total) by mouth once daily.    Family history of prostate cancer  -     PSA, Screening; Future    Type 2 diabetes mellitus with other specified complication, without long-term current use of insulin    Class 2 severe obesity due to excess calories with serious comorbidity and body mass index (BMI) of 35.0 to 35.9 in adult    Erectile dysfunction, unspecified erectile dysfunction type  -     tadalafiL (CIALIS) 20 MG  "Tab; Take 1 tablet (20 mg total) by mouth once daily.          Subjective:       Patient ID: Jonh Richards is a 63 y.o. male.    Chief Complaint: Follow-up (Blood work, med discussion, personal problem)    Interim Hx  Last seen by me 8/2022      Concerns today    Refill cialis  Normal     Chronic problems       Diabetes  He presents for his follow-up diabetic visit. He has type 2 diabetes mellitus. He does not see a podiatrist.Eye exam is not current.       Review of Systems   All other systems reviewed and are negative.            Health Maintenance Due   Topic Date Due    Low Dose Statin  Never done    COVID-19 Vaccine (3 - Moderna risk series) 12/03/2021    Hemoglobin A1c  03/20/2023    PROSTATE-SPECIFIC ANTIGEN  03/21/2023    Lipid Panel  03/21/2023    Diabetes Urine Screening  03/23/2023    Foot Exam  03/23/2023    Shingles Vaccine (2 of 2) 05/25/2023    Eye Exam  08/04/2023    Influenza Vaccine (1) 09/01/2023         Objective:     /60 (BP Location: Right arm, Patient Position: Sitting, BP Method: Large (Manual))   Pulse 60   Ht 5' 11" (1.803 m)   Wt 114.3 kg (251 lb 15.8 oz)   SpO2 98%   BMI 35.14 kg/m²         9/11/2023     9:05 AM 5/11/2023    10:33 AM 8/4/2022     8:47 AM 3/23/2022     7:45 AM 10/29/2021     8:48 AM   Vitals   Height 5' 11" (1.803 m) 5' 11" (1.803 m) 5' 11" (1.803 m) 5' 11" (1.803 m) 5' 11" (1.803 m)   Weight (lbs) 251.99 249.4 248.24 255.51 249.34   BMI (kg/m2) 35.2 34.8 34.6 35.7 34.8                Physical Exam  Vitals and nursing note reviewed.   Constitutional:       General: He is not in acute distress.     Appearance: He is well-developed. He is not ill-appearing, toxic-appearing or diaphoretic.   HENT:      Head: Normocephalic.   Eyes:      Conjunctiva/sclera: Conjunctivae normal.   Cardiovascular:      Rate and Rhythm: Normal rate and regular rhythm.      Pulses:           Dorsalis pedis pulses are 2+ on the right side and 2+ on the left side.        Posterior " tibial pulses are 2+ on the right side and 2+ on the left side.      Heart sounds: Normal heart sounds. No murmur heard.     No friction rub. No gallop.   Pulmonary:      Effort: Pulmonary effort is normal. No respiratory distress.      Breath sounds: Normal breath sounds. No stridor.   Abdominal:      General: Abdomen is flat. There is no distension.      Palpations: Abdomen is soft.   Musculoskeletal:      Right foot: Normal range of motion. No deformity or bunion.      Left foot: Normal range of motion. No deformity or bunion.   Feet:      Right foot:      Protective Sensation: 10 sites tested.  10 sites sensed.      Skin integrity: No ulcer, blister, skin breakdown, erythema or warmth.      Left foot:      Protective Sensation: 10 sites tested.  10 sites sensed.      Skin integrity: No ulcer, blister, skin breakdown, erythema or warmth.   Neurological:      General: No focal deficit present.      Mental Status: He is alert and oriented to person, place, and time.             Future Appointments   Date Time Provider Department Center   9/12/2023  8:00 AM [facility]   9/28/2023 10:15 AM Kamaljit Lovett MD [facility] [place]           Disclaimer:  This note has been generated using voice-recognition software. There may be grammatical or spelling errors that have been missed during proof-reading

## 2023-09-11 ENCOUNTER — OFFICE VISIT (OUTPATIENT)
Dept: INTERNAL MEDICINE | Facility: CLINIC | Age: 63
End: 2023-09-11
Payer: MEDICARE

## 2023-09-11 VITALS
SYSTOLIC BLOOD PRESSURE: 122 MMHG | DIASTOLIC BLOOD PRESSURE: 60 MMHG | HEIGHT: 71 IN | OXYGEN SATURATION: 98 % | WEIGHT: 252 LBS | HEART RATE: 60 BPM | BODY MASS INDEX: 35.28 KG/M2

## 2023-09-11 DIAGNOSIS — F32.1 CURRENT MODERATE EPISODE OF MAJOR DEPRESSIVE DISORDER WITHOUT PRIOR EPISODE: ICD-10-CM

## 2023-09-11 DIAGNOSIS — Z23 NEED FOR VACCINATION: ICD-10-CM

## 2023-09-11 DIAGNOSIS — E11.69 TYPE 2 DIABETES MELLITUS WITH OTHER SPECIFIED COMPLICATION, WITHOUT LONG-TERM CURRENT USE OF INSULIN: ICD-10-CM

## 2023-09-11 DIAGNOSIS — N52.9 ERECTILE DYSFUNCTION, UNSPECIFIED ERECTILE DYSFUNCTION TYPE: ICD-10-CM

## 2023-09-11 DIAGNOSIS — Z80.42 FAMILY HISTORY OF PROSTATE CANCER: ICD-10-CM

## 2023-09-11 DIAGNOSIS — E66.01 CLASS 2 SEVERE OBESITY DUE TO EXCESS CALORIES WITH SERIOUS COMORBIDITY AND BODY MASS INDEX (BMI) OF 35.0 TO 35.9 IN ADULT: ICD-10-CM

## 2023-09-11 DIAGNOSIS — E11.9 TYPE 2 DIABETES MELLITUS WITHOUT COMPLICATION, WITHOUT LONG-TERM CURRENT USE OF INSULIN: Primary | ICD-10-CM

## 2023-09-11 DIAGNOSIS — I73.9 CLAUDICATION: ICD-10-CM

## 2023-09-11 PROBLEM — E66.812 CLASS 2 SEVERE OBESITY DUE TO EXCESS CALORIES WITH SERIOUS COMORBIDITY AND BODY MASS INDEX (BMI) OF 35.0 TO 35.9 IN ADULT: Status: ACTIVE | Noted: 2023-09-11

## 2023-09-11 PROCEDURE — 99215 OFFICE O/P EST HI 40 MIN: CPT | Mod: PBBFAC,PO | Performed by: INTERNAL MEDICINE

## 2023-09-11 PROCEDURE — 99999 PR PBB SHADOW E&M-EST. PATIENT-LVL V: ICD-10-PCS | Mod: PBBFAC,HCNC,, | Performed by: INTERNAL MEDICINE

## 2023-09-11 PROCEDURE — 90677 PCV20 VACCINE IM: CPT | Mod: PBBFAC,PO

## 2023-09-11 PROCEDURE — 96127 BRIEF EMOTIONAL/BEHAV ASSMT: CPT | Mod: PBBFAC,PO | Performed by: INTERNAL MEDICINE

## 2023-09-11 PROCEDURE — 99999 PR PBB SHADOW E&M-EST. PATIENT-LVL V: CPT | Mod: PBBFAC,HCNC,, | Performed by: INTERNAL MEDICINE

## 2023-09-11 RX ORDER — TADALAFIL 20 MG/1
20 TABLET ORAL DAILY
Qty: 30 TABLET | Refills: 11 | Status: SHIPPED | OUTPATIENT
Start: 2023-09-11 | End: 2023-09-18 | Stop reason: SDUPTHER

## 2023-09-11 RX ORDER — TADALAFIL 20 MG/1
20 TABLET ORAL DAILY
Qty: 30 TABLET | Refills: 11 | Status: SHIPPED | OUTPATIENT
Start: 2023-09-11 | End: 2023-09-11 | Stop reason: SDUPTHER

## 2023-09-11 RX ORDER — SERTRALINE HYDROCHLORIDE 25 MG/1
25 TABLET, FILM COATED ORAL DAILY
Qty: 90 TABLET | Refills: 1 | Status: SHIPPED | OUTPATIENT
Start: 2023-09-11 | End: 2024-09-10

## 2023-09-11 RX ORDER — TADALAFIL 20 MG/1
20 TABLET ORAL DAILY
Qty: 30 TABLET | Refills: 11 | OUTPATIENT
Start: 2023-09-11 | End: 2024-09-10

## 2023-09-11 NOTE — PATIENT INSTRUCTIONS
We were happy to see you today    For your Testing  Please have your labs and/or imaging test done  today   Labs today  Circulation testing of the leg called JOSE RAFAEL       For your Medication   Cialis extra strenght  We will refill the sertraline   For more information about side effects please visit medlineplus.gov        For your Referrals  Eye doctor   Digital meidcine       For your Vaccinations  We gave your the following vaccines  [place]   Please obtain the following vaccines at the pharmacy          Please return to clinic in    Follow up for 4 week for physical, LABS Today, Referral to eye/foot doctor, PNEUMONIA Shot.        Extra resources

## 2023-09-11 NOTE — TELEPHONE ENCOUNTER
Refill Decision Note   Jonh Richards  is requesting a refill authorization.  Brief Assessment and Rationale for Refill:  Quick Discontinue     Medication Therapy Plan:         Comments:     Note composed:5:57 PM 09/11/2023

## 2023-09-14 ENCOUNTER — HOSPITAL ENCOUNTER (OUTPATIENT)
Dept: CARDIOLOGY | Facility: HOSPITAL | Age: 63
Discharge: HOME | End: 2023-09-14
Attending: INTERNAL MEDICINE
Payer: MEDICARE

## 2023-09-14 DIAGNOSIS — I73.9 CLAUDICATION: ICD-10-CM

## 2023-09-14 LAB
IMMEDIATE ARM BP: 123 MMHG
IMMEDIATE LEFT ABI: 1.48
IMMEDIATE LEFT TIBIAL: 182 MMHG
IMMEDIATE RIGHT ABI: 1.41
IMMEDIATE RIGHT TIBIAL: 174 MMHG
LEFT ABI: 1.42
LEFT ARM BP: 108 MMHG
LEFT DORSALIS PEDIS: 154 MMHG
LEFT POSTERIOR TIBIAL: 155 MMHG
POST1 ARM BP: 123 MMHG
POST1 LEFT ABI: 1.38
POST1 LEFT TIBIAL: 170 MMHG
POST1 RIGHT ABI: 1.48
POST1 RIGHT TIBIAL: 182 MMHG
RIGHT ABI: 1.42
RIGHT ARM BP: 109 MMHG
RIGHT DORSALIS PEDIS: 137 MMHG
RIGHT POSTERIOR TIBIAL: 155 MMHG
TREADMILL GRADE: 12 %
TREADMILL SPEED: 2 MPH
TREADMILL TIME: 5 MIN

## 2023-09-14 PROCEDURE — 93924 LWR XTR VASC STDY BILAT: CPT | Mod: HCNC,PO

## 2023-09-14 PROCEDURE — 93924 LWR XTR VASC STDY BILAT: CPT | Mod: 26,HCNC,, | Performed by: INTERNAL MEDICINE

## 2023-09-14 PROCEDURE — 93924 ANKLE BRACHIAL INDICES (ABI): ICD-10-PCS | Mod: 26,HCNC,, | Performed by: INTERNAL MEDICINE

## 2023-09-18 DIAGNOSIS — N52.9 ERECTILE DYSFUNCTION, UNSPECIFIED ERECTILE DYSFUNCTION TYPE: ICD-10-CM

## 2023-09-19 ENCOUNTER — PATIENT MESSAGE (OUTPATIENT)
Dept: ADMINISTRATIVE | Facility: OTHER | Age: 63
End: 2023-09-19
Payer: MEDICARE

## 2023-09-19 RX ORDER — TADALAFIL 20 MG/1
20 TABLET ORAL DAILY
Qty: 30 TABLET | Refills: 11 | Status: SHIPPED | OUTPATIENT
Start: 2023-09-19 | End: 2024-09-18

## 2023-09-19 NOTE — TELEPHONE ENCOUNTER
Refill Routing Note   Medication(s) are not appropriate for processing by [facility] for the following reason(s):      New or recently adjusted medication    [facility] action(s):  Defer Care Due:  None identified            Appointments  past 12m or future 3m with PCP    Date Provider   Last Visit   9/11/2023 Jericho Chavira III, MD   Next Visit   10/11/2023 Jericho Chavira III, MD   ED visits in past 90 days: 0        Note composed:8:53 AM 09/19/2023

## 2023-11-21 ENCOUNTER — TELEPHONE (OUTPATIENT)
Dept: INTERNAL MEDICINE | Facility: CLINIC | Age: 63
End: 2023-11-21
Payer: MEDICARE

## 2024-03-21 DIAGNOSIS — E11.9 TYPE 2 DIABETES MELLITUS WITHOUT COMPLICATION: ICD-10-CM

## 2024-04-14 NOTE — TELEPHONE ENCOUNTER
No care due was identified.  [facility] Embedded Care Due Messages. Reference number: [id number].   9/18/2023 4:29:38 PM CDT   Him/He Various/Other

## 2024-04-23 ENCOUNTER — TELEPHONE (OUTPATIENT)
Dept: INTERNAL MEDICINE | Facility: CLINIC | Age: 64
End: 2024-04-23
Payer: MEDICARE

## 2024-04-23 NOTE — TELEPHONE ENCOUNTER
Called and spoke with pt in regard to appointment originally scheduled on 05/22/24 . Pt agreed to reschedule appt to 06/10/24

## 2024-05-23 ENCOUNTER — PATIENT OUTREACH (OUTPATIENT)
Dept: ADMINISTRATIVE | Facility: HOSPITAL | Age: 64
End: 2024-05-23
Payer: MEDICARE

## 2024-05-23 NOTE — PROGRESS NOTES
Health Maintenance Topic(s) Outreach Outcomes & Actions Taken:    Eye Exam - Outreach Outcomes & Actions Taken  : Pt Will Schedule with External Provider / Order Routed & Care Team Updated if Applicable    Lab(s) - Outreach Outcomes & Actions Taken  : Overdue Lab(s) Scheduled     Additional Notes:           Care Management, Digital Medicine, and/or Education Referrals      Next Steps - Referral Actions: na

## 2024-06-06 ENCOUNTER — LAB VISIT (OUTPATIENT)
Dept: LAB | Facility: HOSPITAL | Age: 64
End: 2024-06-06
Attending: INTERNAL MEDICINE
Payer: MEDICARE

## 2024-06-06 DIAGNOSIS — E11.9 TYPE 2 DIABETES MELLITUS WITHOUT COMPLICATION: ICD-10-CM

## 2024-06-06 LAB
ESTIMATED AVG GLUCOSE: 137 MG/DL (ref 68–131)
HBA1C MFR BLD: 6.4 % (ref 4–5.6)

## 2024-06-06 PROCEDURE — 83036 HEMOGLOBIN GLYCOSYLATED A1C: CPT | Performed by: INTERNAL MEDICINE

## 2024-06-06 PROCEDURE — 36415 COLL VENOUS BLD VENIPUNCTURE: CPT | Mod: PO | Performed by: INTERNAL MEDICINE

## 2024-06-19 PROBLEM — I73.9 CLAUDICATION: Status: ACTIVE | Noted: 2024-06-19

## 2024-06-19 PROBLEM — I73.9 CLAUDICATION: Status: RESOLVED | Noted: 2024-06-19 | Resolved: 2024-06-19

## 2024-09-19 DIAGNOSIS — E11.69 TYPE 2 DIABETES MELLITUS WITH OTHER SPECIFIED COMPLICATION, WITHOUT LONG-TERM CURRENT USE OF INSULIN: ICD-10-CM

## 2024-09-19 DIAGNOSIS — E11.9 TYPE 2 DIABETES MELLITUS WITHOUT COMPLICATION: ICD-10-CM

## 2024-10-23 ENCOUNTER — OFFICE VISIT (OUTPATIENT)
Dept: FAMILY MEDICINE | Facility: CLINIC | Age: 64
End: 2024-10-23
Payer: MEDICARE

## 2024-10-23 VITALS
SYSTOLIC BLOOD PRESSURE: 130 MMHG | WEIGHT: 255.88 LBS | OXYGEN SATURATION: 100 % | HEART RATE: 54 BPM | BODY MASS INDEX: 35.82 KG/M2 | DIASTOLIC BLOOD PRESSURE: 80 MMHG | HEIGHT: 71 IN

## 2024-10-23 DIAGNOSIS — N52.9 ERECTILE DYSFUNCTION, UNSPECIFIED ERECTILE DYSFUNCTION TYPE: ICD-10-CM

## 2024-10-23 DIAGNOSIS — Z23 INFLUENZA VACCINE NEEDED: Primary | ICD-10-CM

## 2024-10-23 DIAGNOSIS — E66.812 CLASS 2 SEVERE OBESITY DUE TO EXCESS CALORIES WITH SERIOUS COMORBIDITY AND BODY MASS INDEX (BMI) OF 35.0 TO 35.9 IN ADULT: ICD-10-CM

## 2024-10-23 DIAGNOSIS — F32.1 CURRENT MODERATE EPISODE OF MAJOR DEPRESSIVE DISORDER WITHOUT PRIOR EPISODE: ICD-10-CM

## 2024-10-23 DIAGNOSIS — E11.69 TYPE 2 DIABETES MELLITUS WITH OTHER SPECIFIED COMPLICATION, WITHOUT LONG-TERM CURRENT USE OF INSULIN: ICD-10-CM

## 2024-10-23 DIAGNOSIS — E66.01 CLASS 2 SEVERE OBESITY DUE TO EXCESS CALORIES WITH SERIOUS COMORBIDITY AND BODY MASS INDEX (BMI) OF 35.0 TO 35.9 IN ADULT: ICD-10-CM

## 2024-10-23 PROCEDURE — 99999 PR PBB SHADOW E&M-EST. PATIENT-LVL III: CPT | Mod: PBBFAC,,, | Performed by: STUDENT IN AN ORGANIZED HEALTH CARE EDUCATION/TRAINING PROGRAM

## 2024-10-23 PROCEDURE — 1160F RVW MEDS BY RX/DR IN RCRD: CPT | Mod: CPTII,S$GLB,, | Performed by: STUDENT IN AN ORGANIZED HEALTH CARE EDUCATION/TRAINING PROGRAM

## 2024-10-23 PROCEDURE — G0008 ADMIN INFLUENZA VIRUS VAC: HCPCS | Mod: S$GLB,,, | Performed by: STUDENT IN AN ORGANIZED HEALTH CARE EDUCATION/TRAINING PROGRAM

## 2024-10-23 PROCEDURE — 3044F HG A1C LEVEL LT 7.0%: CPT | Mod: CPTII,S$GLB,, | Performed by: STUDENT IN AN ORGANIZED HEALTH CARE EDUCATION/TRAINING PROGRAM

## 2024-10-23 PROCEDURE — 3008F BODY MASS INDEX DOCD: CPT | Mod: CPTII,S$GLB,, | Performed by: STUDENT IN AN ORGANIZED HEALTH CARE EDUCATION/TRAINING PROGRAM

## 2024-10-23 PROCEDURE — 1159F MED LIST DOCD IN RCRD: CPT | Mod: CPTII,S$GLB,, | Performed by: STUDENT IN AN ORGANIZED HEALTH CARE EDUCATION/TRAINING PROGRAM

## 2024-10-23 PROCEDURE — 90656 IIV3 VACC NO PRSV 0.5 ML IM: CPT | Mod: S$GLB,,, | Performed by: STUDENT IN AN ORGANIZED HEALTH CARE EDUCATION/TRAINING PROGRAM

## 2024-10-23 PROCEDURE — 3079F DIAST BP 80-89 MM HG: CPT | Mod: CPTII,S$GLB,, | Performed by: STUDENT IN AN ORGANIZED HEALTH CARE EDUCATION/TRAINING PROGRAM

## 2024-10-23 PROCEDURE — 3075F SYST BP GE 130 - 139MM HG: CPT | Mod: CPTII,S$GLB,, | Performed by: STUDENT IN AN ORGANIZED HEALTH CARE EDUCATION/TRAINING PROGRAM

## 2024-10-23 PROCEDURE — 99214 OFFICE O/P EST MOD 30 MIN: CPT | Mod: S$GLB,,, | Performed by: STUDENT IN AN ORGANIZED HEALTH CARE EDUCATION/TRAINING PROGRAM

## 2024-10-23 RX ORDER — ATORVASTATIN CALCIUM 40 MG/1
40 TABLET, FILM COATED ORAL DAILY
Qty: 90 TABLET | Refills: 3 | Status: SHIPPED | OUTPATIENT
Start: 2024-10-23 | End: 2025-10-23

## 2024-10-23 RX ORDER — TADALAFIL 20 MG/1
20 TABLET ORAL DAILY
Qty: 30 TABLET | Refills: 11 | Status: SHIPPED | OUTPATIENT
Start: 2024-10-23 | End: 2025-10-23

## 2024-10-23 RX ORDER — SERTRALINE HYDROCHLORIDE 25 MG/1
25 TABLET, FILM COATED ORAL DAILY
Qty: 90 TABLET | Refills: 1 | Status: SHIPPED | OUTPATIENT
Start: 2024-10-23 | End: 2025-10-23

## 2024-10-23 RX ORDER — TIRZEPATIDE 2.5 MG/.5ML
2.5 INJECTION, SOLUTION SUBCUTANEOUS
Qty: 2 ML | Refills: 0 | Status: SHIPPED | OUTPATIENT
Start: 2024-10-23 | End: 2024-11-22

## 2024-10-23 RX ORDER — TADALAFIL 20 MG/1
20 TABLET ORAL DAILY
Qty: 30 TABLET | Refills: 11 | Status: SHIPPED | OUTPATIENT
Start: 2024-10-23 | End: 2024-10-23 | Stop reason: SDUPTHER

## 2024-10-23 NOTE — PROGRESS NOTES
"Patient ID: Jonh Richards is a 64 y.o. male.    Chief Complaint: Follow-up, Foot Pain (Rt about a month ago ), and Annual Exam    History of Present Illness    CHIEF COMPLAINT:  Jonh presents today for follow-up and weight management.    WEIGHT MANAGEMENT:  He reports a current weight of approximately 250 lbs and expresses concern about weight gain affecting his health, particularly mentioning discomfort in his left knee. He acknowledges previous weight loss attempts about 6 years ago as recommended by his primary care physician, but was unable to maintain the suggested eating habits. He expresses interest in weight loss medication to assist with his weight management goals.    DIABETES:  He reports a recent A1C of 6.4, which is at the diabetes level. He previously used Metformin for diabetes management but discontinued it when his A1C level improved, as instructed by a previous doctor. He expresses interest in recommendations for managing his diabetes and acknowledges the need for weight loss to help improve his numbers.    MENTAL HEALTH:  He requests renewal of sertraline prescription, reporting increased aggression without the medication. He states that it has been effective for him in the past.    SEXUAL HEALTH:  He requests renewal of Cialis prescription, reporting that the medication has been effective for him in the past.    CARDIOVASCULAR HEALTH:  He reports his blood pressure has been good. He requests renewal of his cholesterol medication, indicating he has run out and his cholesterol levels were elevated.    LIFESTYLE:  He reports consuming wine and acknowledges drinking "too much." He denies having a current exercise routine. His sleep patterns are irregular, typically getting about 6 hours of sleep per night. He wakes up early, around 4:30-5:30 AM, and often naps during the day between 1:00-3:00 PM. 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    PREVENTIVE CARE:  He requests flu vaccine. He reports having received a PSA screening for prostate cancer approximately 5-8 years ago. He denies typically rico influenza but states he routinely receives the annual vaccination.      ROS:  General: -fever, -chills, -fatigue, +weight gain, -weight loss  Eyes: -vision changes, -redness, -discharge  ENT: -ear pain, -nasal congestion, -sore throat  Cardiovascular: -chest pain, -palpitations, -lower extremity edema  Respiratory: -cough, -shortness of breath  Gastrointestinal: -abdominal pain, -nausea, -vomiting, -diarrhea, -constipation, -blood in stool  Genitourinary: -dysuria, -hematuria, -frequency  Musculoskeletal: +joint pain, -muscle pain  Skin: -rash, -lesion  Neurological: -headache, -dizziness, -numbness, -tingling  Psychiatric: -anxiety, -depression, +sleep difficulty         Physical Exam    Vitals: Weight: 253 lbs. BMI: 35.  General: No acute distress. Well-developed. Well-nourished.  Eyes: EOMI. Sclerae anicteric.  HENT: Normocephalic. Atraumatic. Nares patent. Moist oral mucosa.  Ears: Bilateral TMs clear. Bilateral EACs clear.  Cardiovascular: Regular rate. Regular rhythm. No murmurs. No rubs. No gallops. Normal S1, S2.  Respiratory: Normal respiratory effort. Clear to auscultation bilaterally. No rales. No rhonchi. No wheezing.  Abdomen: Soft. Non-tender. Non-distended. Normoactive bowel sounds.  Musculoskeletal: No  obvious deformity.  Extremities: No lower extremity edema.  Neurological: Alert & oriented x3. No slurred speech. Normal gait.  Psychiatric: Normal mood. Normal affect. Good insight. Good judgment.  Skin: Warm. Dry. No rash.         Assessment & Plan    DIABETES:  - Assessed patient's A1C of 6.4, indicating diabetes.  - Considered injectable diabetes medication (Mounjaro or Ozempic) for weight loss and glucose control.  - Explained injectable " diabetes medications (Mounjaro/Ozempic) work by decreasing appetite.  - Discussed potential side effects of injectable diabetes medications, including nausea, heaviness, and constipation.  - Contact the office if unable to obtain Mounjaro/Ozempic from the pharmacy.  - Follow up monthly if Mounjaro/Ozempic is started to adjust dosage.    HYPERLIPIDEMIA:  - Evaluated need for cholesterol medication.  - Started atorvastatin for elevated cholesterol.    PROSTATE CANCER SCREENING:  - Discussed PSA screening for prostate cancer, weighing benefits against potential for false positives and unnecessary anxiety.  - Ordered PSA test.    AGGRESSION MANAGEMENT:  - Reviewed patient's history of sertraline use for aggression management.  - Started sertraline 20 mg daily.    WEIGHT MANAGEMENT:  - Educated on the limited role of exercise in weight loss but its importance in weight maintenance.  - Informed about empty calories in alcohol consumption.  - Ray to implement portion control and healthier eating habits.  - Recommend increasing vegetable intake.  - Ray to incorporate more protein-rich foods like chicken breast.  - Recommend maintaining physical activity to prevent weight gain.  - Ray to aim for 7-8 hours of sleep per night.  - Recommend reducing alcohol consumption.    ERECTILE DYSFUNCTION:  - Restarted Cialis.    FLU VACCINATION:  - Administered flu vaccine in office.    OPTOMETRY REFERRAL:  - Referred patient to an optometrist.    LABS:  - Ordered labs.    FOLLOW UP:  - Follow up in 3 months to review lab results and medication efficacy.       1. Influenza vaccine needed  -     influenza (Flulaval, Fluzone, Fluarix) 45 mcg/0.5 mL IM vaccine (> or = 6 mo) 0.5 mL    2. Erectile dysfunction, unspecified erectile dysfunction type  Assessment & Plan:  Patient previously on Cialis, which worked for him    Orders:  -     tadalafiL (CIALIS) 20 MG Tab; Take 1 tablet (20 mg total) by mouth once daily.  Dispense: 30 tablet; Refill:  11    3. Type 2 diabetes mellitus with other specified complication, without long-term current use of insulin  Assessment & Plan:  Diabetes plan:   Lifestyle Modifications: Counseled patient on the importance of regular physical activity, healthy eating habits, weight management, and smoking cessation to achieve glycemic control and reduce cardiovascular risk.  Medication therapy: Prescribed Mounjaro 2.5 mg to achieve and maintain target blood glucose levels. Adjusted medication regimen based on glycemic control and individual patient factors.  Monitoring:Instructed patient on self-monitoring blood glucose levels and regular follow up for hemoglobin A1c testing and diabetes management review.  Complications Screening: Recommended regular screening for diabetes-related complications including retinopathy, nephropathy, and cardiovascular disease.  Education: Provided patient with commprehensive education on diabetes self-management, including medication administration, blood glucose monitoring, hypoglycemia management and sick day guidelines.  Follow up in 3 months.     Orders:  -     Comprehensive Metabolic Panel; Future; Expected date: 10/23/2024  -     Hemoglobin A1C; Future; Expected date: 10/23/2024  -     Lipid Panel; Future; Expected date: 10/23/2024  -     Cancel: PSA, Screening; Future; Expected date: 10/23/2024  -     Microalbumin/creatinine urine ratio; Future; Expected date: 10/23/2024  -     Ambulatory referral/consult to Optometry; Future; Expected date: 10/30/2024  -     atorvastatin (LIPITOR) 40 MG tablet; Take 1 tablet (40 mg total) by mouth once daily.  Dispense: 90 tablet; Refill: 3    4. Current moderate episode of major depressive disorder without prior episode  Assessment & Plan:  Medication therapy: Prescribed Zoloft (as patient had been previously on it and it worked) to alleviate symptoms and improve mood. Discussed potential side effects and expected therapeutic effects.   Lifestyle  modifications: Advised patient on the importance of regular exercise, adequate sleep, healthy diet, and stress reduction techniques (e.g mindfulness, relaxation exercises) in managing symptoms.  Follow up: Scheduled for regular follow up visits to monitor response to treatment and adjust therapy as needed. Encouraged patient to report any worsening symptoms or emergence of suicidal thoughts immediately for prompt intervention.     Orders:  -     sertraline (ZOLOFT) 25 MG tablet; Take 1 tablet (25 mg total) by mouth once daily.  Dispense: 90 tablet; Refill: 1    5. Class 2 severe obesity due to excess calories with serious comorbidity and body mass index (BMI) of 35.0 to 35.9 in adult  Assessment & Plan:  Lifestyle Modifications: Counseled patient on adopting a comprehensive approach to weight management, including dietary changes (portion control, balanced diet, reduced calorie intake), regular physical activity, behavior modification techniques and stress management strategies.  Nutritional Counseling: Discussed referral to a registered dietitian for individualized nutritional assessment and counseling to develop a personalized meal plan and dietary strategies for weight loss. Patient states that he does not think this will help as he cannot stick to the plan.  Physical Activity Recommendations: Prescribed regular physical activity, including aerobic exercise and strength training, to promote weight loss, improve cardiovascular fitness, and enhance overall health.  Medical Intervention: Discussed the option of pharmacotherapy or bariatric surgery for patients with severe obesity or obesity-related co-morbidities who have not achieved sufficient weight loss with lifestyle modifications alone. Prescribed Mounjaro 2.5 mg weekly  Follow up: Scheduled for regular follow up visits to monitor progress, assess adherence to lifestyle modifications, and provide ongoing support and guidance for weight management goals.        Other orders  -     tirzepatide (MOUNJARO) 2.5 mg/0.5 mL PnIj; Inject 2.5 mg into the skin every 7 days.  Dispense: 2 mL; Refill: 0          Follow up in 3 months to discuss weight management/DM control    No follow-ups on file.    This note was generated with the assistance of ambient listening technology. Verbal consent was obtained by the patient and accompanying visitor(s) for the recording of patient appointment to facilitate this note. I attest to having reviewed and edited the generated note for accuracy, though some syntax or spelling errors may persist. Please contact the author of this note for any clarification.

## 2024-10-23 NOTE — ASSESSMENT & PLAN NOTE
Diabetes plan:   Lifestyle Modifications: Counseled patient on the importance of regular physical activity, healthy eating habits, weight management, and smoking cessation to achieve glycemic control and reduce cardiovascular risk.  Medication therapy: Prescribed Mounjaro 2.5 mg to achieve and maintain target blood glucose levels. Adjusted medication regimen based on glycemic control and individual patient factors.  Monitoring:Instructed patient on self-monitoring blood glucose levels and regular follow up for hemoglobin A1c testing and diabetes management review.  Complications Screening: Recommended regular screening for diabetes-related complications including retinopathy, nephropathy, and cardiovascular disease.  Education: Provided patient with commprehensive education on diabetes self-management, including medication administration, blood glucose monitoring, hypoglycemia management and sick day guidelines.  Follow up in 3 months.

## 2024-10-23 NOTE — ASSESSMENT & PLAN NOTE
Lifestyle Modifications: Counseled patient on adopting a comprehensive approach to weight management, including dietary changes (portion control, balanced diet, reduced calorie intake), regular physical activity, behavior modification techniques and stress management strategies.  Nutritional Counseling: Discussed referral to a registered dietitian for individualized nutritional assessment and counseling to develop a personalized meal plan and dietary strategies for weight loss. Patient states that he does not think this will help as he cannot stick to the plan.  Physical Activity Recommendations: Prescribed regular physical activity, including aerobic exercise and strength training, to promote weight loss, improve cardiovascular fitness, and enhance overall health.  Medical Intervention: Discussed the option of pharmacotherapy or bariatric surgery for patients with severe obesity or obesity-related co-morbidities who have not achieved sufficient weight loss with lifestyle modifications alone. Prescribed Mounjaro 2.5 mg weekly  Follow up: Scheduled for regular follow up visits to monitor progress, assess adherence to lifestyle modifications, and provide ongoing support and guidance for weight management goals.

## 2024-10-23 NOTE — ASSESSMENT & PLAN NOTE
Medication therapy: Prescribed Zoloft (as patient had been previously on it and it worked) to alleviate symptoms and improve mood. Discussed potential side effects and expected therapeutic effects.   Lifestyle modifications: Advised patient on the importance of regular exercise, adequate sleep, healthy diet, and stress reduction techniques (e.g mindfulness, relaxation exercises) in managing symptoms.  Follow up: Scheduled for regular follow up visits to monitor response to treatment and adjust therapy as needed. Encouraged patient to report any worsening symptoms or emergence of suicidal thoughts immediately for prompt intervention.

## 2024-10-23 NOTE — TELEPHONE ENCOUNTER
----- Message from Vianey sent at 10/23/2024  2:47 PM CDT -----  .Type:  Needs Medical Advice    Who Called: pt    Would the patient rather a call back or a response via [facility]? Call back  Best Call Back Number: [phone]  Additional Information:     Pt stated she would like a call back regarding his medications for tadalafiL (CIALIS) 20 MG Tab because his insurance doesn't pay for it thru [facility] and he would like it to be sent to [facility] ([place] - [place] - [address]

## 2024-11-12 ENCOUNTER — PATIENT MESSAGE (OUTPATIENT)
Dept: ADMINISTRATIVE | Facility: HOSPITAL | Age: 64
End: 2024-11-12
Payer: MEDICARE

## 2024-11-15 ENCOUNTER — TELEPHONE (OUTPATIENT)
Dept: FAMILY MEDICINE | Facility: CLINIC | Age: 64
End: 2024-11-15
Payer: MEDICARE

## 2024-11-15 DIAGNOSIS — Z12.5 SCREENING FOR PROSTATE CANCER: Primary | ICD-10-CM

## 2024-11-15 RX ORDER — TIRZEPATIDE 5 MG/.5ML
5 INJECTION, SOLUTION SUBCUTANEOUS
Qty: 2 ML | Refills: 0 | Status: SHIPPED | OUTPATIENT
Start: 2024-11-15 | End: 2024-11-15 | Stop reason: SDUPTHER

## 2024-11-15 RX ORDER — TIRZEPATIDE 2.5 MG/.5ML
2.5 INJECTION, SOLUTION SUBCUTANEOUS
Qty: 2 ML | Refills: 0 | Status: SHIPPED | OUTPATIENT
Start: 2024-11-15 | End: 2024-11-15

## 2024-11-15 RX ORDER — TIRZEPATIDE 5 MG/.5ML
5 INJECTION, SOLUTION SUBCUTANEOUS
Qty: 2 ML | Refills: 0 | Status: SHIPPED | OUTPATIENT
Start: 2024-11-15 | End: 2024-12-15

## 2024-11-15 NOTE — TELEPHONE ENCOUNTER
----- Message from Katherine sent at 11/15/2024  2:48 PM CST -----  Type:  Pharmacy Calling to Clarify an RX      Pharmacy Name:[facility] ([place] - [place] - [address]  [address] [place] [address]  Phone: [phone] Fax: [phone]  Hours: Not open 24 hours      Prescription Name:tirzepatide (MOUNJARO) 5 mg/0.5 mL PnIj  What do they need to clarify?:diagnoses code and mg which one is he on   Best Call Back Number:[phone]  Additional Information:

## 2024-12-16 ENCOUNTER — PATIENT MESSAGE (OUTPATIENT)
Dept: FAMILY MEDICINE | Facility: CLINIC | Age: 64
End: 2024-12-16
Payer: MEDICARE

## 2024-12-16 RX ORDER — TIRZEPATIDE 5 MG/.5ML
5 INJECTION, SOLUTION SUBCUTANEOUS
Qty: 2 ML | Refills: 0 | OUTPATIENT
Start: 2024-12-16 | End: 2025-01-15

## 2024-12-16 NOTE — TELEPHONE ENCOUNTER
I sent the 7.5mg weekly to [facility] (I refused the one you pended given that we decided to up the dose to 7.5 mg). Please let him know. Thanks!

## 2024-12-16 NOTE — TELEPHONE ENCOUNTER
----- Message from Kayy sent at 12/16/2024 11:08 AM CST -----  Regarding: Pt called to speak to the nurse regarding his Rx Refill being sent to the wrong pharmacy and pt would like a call back this morning  Type:  RX Refill Request    Who Called: Pt called to speak to the nurse regarding his Rx Refill being sent to the wrong pharmacy and pt would like a call back this morning  Refill or New Rx: Refill  RX Name and Strength:tirzepatide (MOUNJARO) 5 mg/0.5 mL PnIj  How is the patient currently taking it? (ex. 1XDay):1XDay  Is this a 30 day or 90 day RX: 30 Day Supply  Preferred Pharmacy with phone number:[id number] - [place], [place] - [address] [facility] [address] phone: [phone]  Local or Mail Order: Local  Ordering Provider: Renetta Martínez  Would the patient rather a call back or a response via [facility]? Call Back  Best Call Back Number: [phone]

## 2025-01-13 ENCOUNTER — PATIENT OUTREACH (OUTPATIENT)
Dept: ADMINISTRATIVE | Facility: HOSPITAL | Age: 65
End: 2025-01-13
Payer: MEDICARE

## 2025-01-13 NOTE — PROGRESS NOTES
01/13/2025  VB chart audit performed. Care Everywhere updates requested and reviewed  Overdue HM topic chart audit and/or requested. LINKS triggered and reconciled. Media reviewed Lvm/portal sent regarding overdue health topics

## 2025-01-14 NOTE — TELEPHONE ENCOUNTER
----- Message from Misty sent at 1/14/2025 11:39 AM CST -----  Type:  RX Refill Request    Who Called: Pt   Refill or New Rx: Refill / New   RX Name and Strength: mounjaro 7.5 mg  Muscle relaxer (not on list, pt states  prescribed to him)  Preferred Pharmacy with phone number:[facility] - [address] [facility]  [address] [facility] [address] [facility] [address]  Phone: [phone]  Local or Mail Order: Local   Ordering Provider: Tanya  Would the patient rather a call back or a response via [facility]? Call   Best Call Back Number: [phone]   Additional Information:

## 2025-01-16 ENCOUNTER — PATIENT MESSAGE (OUTPATIENT)
Dept: ADMINISTRATIVE | Facility: HOSPITAL | Age: 65
End: 2025-01-16
Payer: MEDICARE

## 2025-02-13 NOTE — TELEPHONE ENCOUNTER
----- Message from Lindsey sent at 2/13/2025 11:48 AM CST -----  Type:  RX Refill Request    Who Called: Pt   Refill or New Rx:Rx   RX Name and Strength:tirzepatide 7.5 mg/0.5 mL PnIj  How is the patient currently taking it? (ex. 1XDay):1 wk  Is this a 30 day or 90 day RX:  Preferred Pharmacy with phone number:[facility] - [place] - [address] [facility] [address]  Phone: [phone]  Fax: [phone]  Would the patient rather a call back or a response via [facility]? Call back   Best Call Back Number: [phone]  Additional Information:

## 2025-02-18 ENCOUNTER — TELEPHONE (OUTPATIENT)
Dept: FAMILY MEDICINE | Facility: CLINIC | Age: 65
End: 2025-02-18

## 2025-02-18 RX ORDER — NAPROXEN 500 MG/1
500 TABLET ORAL 2 TIMES DAILY WITH MEALS
Qty: 10 TABLET | Refills: 0 | Status: SHIPPED | OUTPATIENT
Start: 2025-02-18 | End: 2025-02-23

## 2025-02-18 NOTE — TELEPHONE ENCOUNTER
----- Message from Lise sent at 2/18/2025  8:08 AM CST -----  Regarding: Call back  Contact: [phone]  Who Called: PT Patient called in requesting to speak with you. Did not specify why. Please advise.

## 2025-02-20 ENCOUNTER — PATIENT OUTREACH (OUTPATIENT)
Dept: ADMINISTRATIVE | Facility: HOSPITAL | Age: 65
End: 2025-02-20

## 2025-02-20 DIAGNOSIS — E11.9 TYPE 2 DIABETES MELLITUS WITHOUT COMPLICATION, WITHOUT LONG-TERM CURRENT USE OF INSULIN: Primary | ICD-10-CM

## 2025-03-10 ENCOUNTER — TELEPHONE (OUTPATIENT)
Dept: FAMILY MEDICINE | Facility: CLINIC | Age: 65
End: 2025-03-10
Payer: MEDICARE

## 2025-03-11 NOTE — TELEPHONE ENCOUNTER
----- Message from Lise sent at 3/11/2025 12:44 PM CDT -----  Regarding: Call back  Contact: [phone]  Type:  RX Refill RequestWho Called: PT Refill or New Rx: Refills RX Name and Strength: tirzepatide 7.5 mg/0.5 mL PnIj 2 mLHow is the patient currently taking it? (ex. 1XDay): 1 x a week Is this a 30 day or 90 day RX: 90 Preferred Pharmacy with phone number: [id number] - [place], [place] - [address] [facility] [address] Phone: [phone] Fax: [phone]

## 2025-04-02 ENCOUNTER — PATIENT OUTREACH (OUTPATIENT)
Dept: ADMINISTRATIVE | Facility: HOSPITAL | Age: 65
End: 2025-04-02
Payer: MEDICARE

## 2025-04-02 NOTE — PROGRESS NOTES
Health Maintenance Topic(s) Outreach Outcomes & Actions Taken:    Eye Exam - Outreach Outcomes & Actions Taken  : Eye Camera Scheduled or Optometry/Ophthalmology Referral Placed/Appt Scheduled

## 2025-04-08 DIAGNOSIS — F32.1 CURRENT MODERATE EPISODE OF MAJOR DEPRESSIVE DISORDER WITHOUT PRIOR EPISODE: ICD-10-CM

## 2025-04-08 DIAGNOSIS — E11.69 TYPE 2 DIABETES MELLITUS WITH OTHER SPECIFIED COMPLICATION, WITHOUT LONG-TERM CURRENT USE OF INSULIN: Primary | ICD-10-CM

## 2025-04-08 DIAGNOSIS — N52.9 ERECTILE DYSFUNCTION, UNSPECIFIED ERECTILE DYSFUNCTION TYPE: ICD-10-CM

## 2025-04-08 RX ORDER — TADALAFIL 20 MG/1
20 TABLET ORAL DAILY
Qty: 30 TABLET | Refills: 11 | Status: SHIPPED | OUTPATIENT
Start: 2025-04-08 | End: 2026-04-08

## 2025-04-08 RX ORDER — SERTRALINE HYDROCHLORIDE 25 MG/1
25 TABLET, FILM COATED ORAL DAILY
Qty: 90 TABLET | Refills: 1 | Status: SHIPPED | OUTPATIENT
Start: 2025-04-08 | End: 2026-04-08

## 2025-04-23 ENCOUNTER — CLINICAL SUPPORT (OUTPATIENT)
Dept: FAMILY MEDICINE | Facility: CLINIC | Age: 65
End: 2025-04-23
Attending: STUDENT IN AN ORGANIZED HEALTH CARE EDUCATION/TRAINING PROGRAM
Payer: MEDICARE

## 2025-04-23 ENCOUNTER — TELEPHONE (OUTPATIENT)
Dept: FAMILY MEDICINE | Facility: CLINIC | Age: 65
End: 2025-04-23

## 2025-04-23 ENCOUNTER — TELEPHONE (OUTPATIENT)
Dept: FAMILY MEDICINE | Facility: CLINIC | Age: 65
End: 2025-04-23
Payer: MEDICARE

## 2025-04-23 ENCOUNTER — OFFICE VISIT (OUTPATIENT)
Dept: FAMILY MEDICINE | Facility: CLINIC | Age: 65
End: 2025-04-23
Payer: MEDICARE

## 2025-04-23 VITALS
WEIGHT: 257.63 LBS | HEART RATE: 77 BPM | DIASTOLIC BLOOD PRESSURE: 80 MMHG | SYSTOLIC BLOOD PRESSURE: 148 MMHG | OXYGEN SATURATION: 98 % | BODY MASS INDEX: 36.07 KG/M2 | HEIGHT: 71 IN

## 2025-04-23 DIAGNOSIS — E11.69 TYPE 2 DIABETES MELLITUS WITH OTHER SPECIFIED COMPLICATION, WITHOUT LONG-TERM CURRENT USE OF INSULIN: ICD-10-CM

## 2025-04-23 DIAGNOSIS — M25.561 CHRONIC PAIN OF RIGHT KNEE: Primary | ICD-10-CM

## 2025-04-23 DIAGNOSIS — E66.01 CLASS 2 SEVERE OBESITY DUE TO EXCESS CALORIES WITH SERIOUS COMORBIDITY AND BODY MASS INDEX (BMI) OF 35.0 TO 35.9 IN ADULT: ICD-10-CM

## 2025-04-23 DIAGNOSIS — E66.812 CLASS 2 SEVERE OBESITY DUE TO EXCESS CALORIES WITH SERIOUS COMORBIDITY AND BODY MASS INDEX (BMI) OF 35.0 TO 35.9 IN ADULT: ICD-10-CM

## 2025-04-23 DIAGNOSIS — F32.1 CURRENT MODERATE EPISODE OF MAJOR DEPRESSIVE DISORDER WITHOUT PRIOR EPISODE: ICD-10-CM

## 2025-04-23 DIAGNOSIS — G89.29 CHRONIC PAIN OF RIGHT KNEE: Primary | ICD-10-CM

## 2025-04-23 DIAGNOSIS — E11.9 TYPE 2 DIABETES MELLITUS WITHOUT COMPLICATION, WITHOUT LONG-TERM CURRENT USE OF INSULIN: ICD-10-CM

## 2025-04-23 DIAGNOSIS — Z12.5 SCREENING FOR PROSTATE CANCER: ICD-10-CM

## 2025-04-23 PROCEDURE — 92228 IMG RTA DETC/MNTR DS PHY/QHP: CPT | Mod: 26,S$GLB,, | Performed by: OPTOMETRIST

## 2025-04-23 PROCEDURE — 99999 PR PBB SHADOW E&M-EST. PATIENT-LVL IV: CPT | Mod: PBBFAC,,, | Performed by: STUDENT IN AN ORGANIZED HEALTH CARE EDUCATION/TRAINING PROGRAM

## 2025-04-23 RX ORDER — SERTRALINE HYDROCHLORIDE 50 MG/1
50 TABLET, FILM COATED ORAL DAILY
Qty: 360 TABLET | Refills: 0 | Status: SHIPPED | OUTPATIENT
Start: 2025-04-23 | End: 2026-04-23

## 2025-04-23 NOTE — PROGRESS NOTES
Patient ID: Jonh Richards is a 65 y.o. male.    Chief Complaint: Follow-up (6 month f/u ) and right knee swelling (Giving out/)    History of Present Illness    CHIEF COMPLAINT:  Jonh presents today with knee pain.    MUSCULOSKELETAL:  He reports knee pain that started in January while working in cold conditions, with recurrence on February 17th. The pain is characterized as stiff and severe, particularly in the morning. Pain intensity decreased during early April but currently fluctuates. He uses icy hot for relief. He has a history of knee issues at age 14, noting current pain is the most severe since that time.    MENTAL HEALTH:  He reports feeling overwhelmed on current Sertraline dose and requests an increase. He expresses significant stress regarding family inheritance matters, specifically concerning 180 acres of family property and a house unused for seven years following deaths of his father, aunt, and uncle.    DIABETES:  He continues Mounjaro (tirzepatide) for diabetes management with notable weight loss, decreasing from size 40 to size 36 pants.    FAMILY HISTORY:  Father had prostate cancer.      ROS:  General: -fever, -chills, -fatigue, -weight gain, -weight loss  Eyes: -vision changes, -redness, -discharge  ENT: -ear pain, -nasal congestion, -sore throat  Cardiovascular: -chest pain, -palpitations, -lower extremity edema  Respiratory: -cough, -shortness of breath  Gastrointestinal: -abdominal pain, -nausea, -vomiting, -diarrhea, -constipation, -blood in stool  Genitourinary: -dysuria, -hematuria, -frequency  Musculoskeletal: +joint pain, -muscle pain, +joint stiffness  Skin: -rash, -lesion  Neurological: -headache, -dizziness, -numbness, -tingling  Psychiatric: -anxiety, +depression, -sleep difficulty         Physical Exam    General: No acute distress. Well-developed. Well-nourished.  Eyes: EOMI. Sclerae anicteric.  HENT: Normocephalic. Atraumatic. Nares patent. Moist oral mucosa.  Ears: Bilateral  TMs clear. Bilateral EACs clear.  Cardiovascular: Regular rate. Regular rhythm. No murmurs. No rubs. No gallops. Normal S1, S2.  Respiratory: Normal respiratory effort. Clear to auscultation bilaterally. No rales. No rhonchi. No wheezing.  Abdomen: Soft. Non-tender. Non-distended. Normoactive bowel sounds.  Musculoskeletal: No  obvious deformity.  Extremities: No lower extremity edema.  Neurological: Alert & oriented x3. No slurred speech. Normal gait.  Psychiatric: Normal mood. Normal affect. Good insight. Good judgment.  Skin: Warm. Dry. No rash.         Assessment & Plan    TYPE 2 DIABETES MELLITUS WITH OTHER SPECIFIED COMPLICATION, WITHOUT LONG-TERM CURRENT USE OF INSULIN:  - Monitored diabetes management, noting significant weight loss success.  - Increased Mounjaro (tirzepatide) from 7.5 mg to 10 mg to further improve glycemic control.  - Ordered labs including A1C to assess sugar control.  - Confirmed absence of neuropathy symptoms or foot ulcers.  - Scheduled follow up in 1 month to assess response to increased medication dose.    OBESITY, CLASS 2:  - Ray reports substantial weight loss progress, now able to wear size 36 pants (down from size 40).  - Continued Mounjaro (tirzepatide) treatment, which is aiding in weight loss.  - The dose increase to 10 mg is expected to further support weight management.    CURRENT MODERATE EPISODE OF MAJOR DEPRESSIVE DISORDER WITHOUT PRIOR EPISODE:  - Assessed depression symptoms and current antidepressant therapy.  - Ray reports feeling overwhelmed due to family issues.  - Increased Sertraline (Zoloft) to 50 mg daily.  - Scheduled follow up in 1 month to evaluate response to increased medication dose.    BILATERAL KNEE PAIN:  - Evaluated knee pain that started in January, noting ongoing fluctuations.  - Ray reports initial severe pain, now varying in intensity, particularly bad when working in cold conditions.  - Knees described as stiff and painful.  - Ordered XR of both  knees for comparison and referred patient to orthopedics for evaluation.  - Discussed possibility of injections as a treatment option.  - Acknowledged patient's use of icy hot for pain relief.    FAMILY HISTORY OF PROSTATE CANCER:  - Noted family history of prostate cancer (patient's father).  - Ordered labs including PSA for prostate cancer screening.    MEDICATION MANAGEMENT:  - Reviewed current medications, noting positive effects of Mounjaro on weight loss.    BLOOD PRESSURE MANAGEMENT:  - Noted occasional BP elevation.  - Ray to monitor BP at home at varying times of day and bring log to next appointment.    FOLLOW-UP:  - Follow up in 1 month to reassess orthopedic evaluation results.       1. Chronic pain of right knee  -     X-Ray Knee 1 or 2 View Bilateral; Future; Expected date: 04/23/2025  -     Ambulatory referral/consult to Orthopedics; Future; Expected date: 04/30/2025    2. Type 2 diabetes mellitus with other specified complication, without long-term current use of insulin  Assessment & Plan:  Diabetes plan:   Lifestyle Modifications: Counseled patient on the importance of regular physical activity, healthy eating habits, weight management, and smoking cessation to achieve glycemic control and reduce cardiovascular risk.  Medication therapy: Prescribed Mounjaro 10 mg to achieve and maintain target blood glucose levels. Adjusted medication regimen based on glycemic control and individual patient factors.  Monitoring:Instructed patient on self-monitoring blood glucose levels and regular follow up for hemoglobin A1c testing and diabetes management review.  Complications Screening: Recommended regular screening for diabetes-related complications including retinopathy, nephropathy, and cardiovascular disease.  Education: Provided patient with commprehensive education on diabetes self-management, including medication administration, blood glucose monitoring, hypoglycemia management and sick day  guidelines.  Follow up in 3 months.     Protective Sensation (w/ 10 gram monofilament):  Right: Intact  Left: Intact    Visual Inspection:  Normal -  Bilateral    Pedal Pulses:   Right: Present  Left: Present    Posterior Tibialis Pulses:   Right:Present  Left: Present     Orders:  -     Hemoglobin A1C; Future; Expected date: 04/23/2025  -     Diabetic Eye Screening Photo; Future  -     tirzepatide 10 mg/0.5 mL PnIj; Inject 10 mg into the skin every 7 days.  Dispense: 6 mL; Refill: 0    3. Class 2 severe obesity due to excess calories with serious comorbidity and body mass index (BMI) of 35.0 to 35.9 in adult    4. Current moderate episode of major depressive disorder without prior episode  -     sertraline (ZOLOFT) 50 MG tablet; Take 1 tablet (50 mg total) by mouth once daily.  Dispense: 360 tablet; Refill: 0    5. Screening for prostate cancer  -     PSA, Screening; Future; Expected date: 04/23/2025              No follow-ups on file.    This note was generated with the assistance of ambient listening technology. Verbal consent was obtained by the patient and accompanying visitor(s) for the recording of patient appointment to facilitate this note. I attest to having reviewed and edited the generated note for accuracy, though some syntax or spelling errors may persist. Please contact the author of this note for any clarification.

## 2025-04-23 NOTE — TELEPHONE ENCOUNTER
Called the prior Auth department to see why this pts medicine was denied. They informed me that they didn't have sufficient amount of evidence and needed us to appeal; transferred me to appeals at [phone]. Appeals states they will expedite the rx and to send notes/labs to [phone] with Confirmation number [id number] and pts name.

## 2025-04-23 NOTE — ASSESSMENT & PLAN NOTE
Diabetes plan:   Lifestyle Modifications: Counseled patient on the importance of regular physical activity, healthy eating habits, weight management, and smoking cessation to achieve glycemic control and reduce cardiovascular risk.  Medication therapy: Prescribed Mounjaro 10 mg to achieve and maintain target blood glucose levels. Adjusted medication regimen based on glycemic control and individual patient factors.  Monitoring:Instructed patient on self-monitoring blood glucose levels and regular follow up for hemoglobin A1c testing and diabetes management review.  Complications Screening: Recommended regular screening for diabetes-related complications including retinopathy, nephropathy, and cardiovascular disease.  Education: Provided patient with commprehensive education on diabetes self-management, including medication administration, blood glucose monitoring, hypoglycemia management and sick day guidelines.  Follow up in 3 months.     Protective Sensation (w/ 10 gram monofilament):  Right: Intact  Left: Intact    Visual Inspection:  Normal -  Bilateral    Pedal Pulses:   Right: Present  Left: Present    Posterior Tibialis Pulses:   Right:Present  Left: Present

## 2025-04-23 NOTE — PROGRESS NOTES
Jonh Richards is a 65 y.o. male here for a diabetic eye screening with non-dilated fundus photos per Dr Martínez .    Patient cooperative?: Yes  Small pupils?: Yes  Last eye exam: 8/31/21    For exam results, see Encounter Report.

## 2025-05-05 ENCOUNTER — PATIENT MESSAGE (OUTPATIENT)
Dept: FAMILY MEDICINE | Facility: CLINIC | Age: 65
End: 2025-05-05
Payer: MEDICARE

## 2025-05-27 DIAGNOSIS — N52.9 ERECTILE DYSFUNCTION, UNSPECIFIED ERECTILE DYSFUNCTION TYPE: ICD-10-CM

## 2025-05-27 RX ORDER — TADALAFIL 20 MG/1
20 TABLET ORAL DAILY
Qty: 30 TABLET | Refills: 11 | Status: SHIPPED | OUTPATIENT
Start: 2025-05-27 | End: 2026-05-27

## 2025-05-27 NOTE — TELEPHONE ENCOUNTER
----- Message from Krissy sent at 5/27/2025  2:06 PM CDT -----  Type:  RX Refill RequestWho Called: pt Refill or New Rx:refillRX Name and Strength:tadalafiL (CIALIS) 20 MG TabPreferred Pharmacy with phone number:[facility]  [place], [place] - [address] [facility] [id number] [facility] [address] [facility] [id number]: [phone] Fax: [phone] or Mail Order:local Ordering Provider:diego Would the patient rather a call back or a response via [facility]? Call Best Call Back Number: [phone] Information:

## 2025-05-30 ENCOUNTER — OFFICE VISIT (OUTPATIENT)
Dept: FAMILY MEDICINE | Facility: CLINIC | Age: 65
End: 2025-05-30
Payer: MEDICARE

## 2025-05-30 VITALS
DIASTOLIC BLOOD PRESSURE: 80 MMHG | OXYGEN SATURATION: 98 % | BODY MASS INDEX: 34.66 KG/M2 | HEART RATE: 64 BPM | WEIGHT: 247.56 LBS | SYSTOLIC BLOOD PRESSURE: 135 MMHG | HEIGHT: 71 IN

## 2025-05-30 DIAGNOSIS — G89.29 CHRONIC BILATERAL LOW BACK PAIN WITH LEFT-SIDED SCIATICA: ICD-10-CM

## 2025-05-30 DIAGNOSIS — E11.9 TYPE 2 DIABETES MELLITUS WITHOUT COMPLICATION, WITHOUT LONG-TERM CURRENT USE OF INSULIN: ICD-10-CM

## 2025-05-30 DIAGNOSIS — Z00.00 ANNUAL PHYSICAL EXAM: Primary | ICD-10-CM

## 2025-05-30 DIAGNOSIS — Z12.5 ENCOUNTER FOR SCREENING FOR MALIGNANT NEOPLASM OF PROSTATE: ICD-10-CM

## 2025-05-30 DIAGNOSIS — M54.42 CHRONIC BILATERAL LOW BACK PAIN WITH LEFT-SIDED SCIATICA: ICD-10-CM

## 2025-05-30 PROCEDURE — 3008F BODY MASS INDEX DOCD: CPT | Mod: CPTII,S$GLB,, | Performed by: STUDENT IN AN ORGANIZED HEALTH CARE EDUCATION/TRAINING PROGRAM

## 2025-05-30 PROCEDURE — 1160F RVW MEDS BY RX/DR IN RCRD: CPT | Mod: CPTII,S$GLB,, | Performed by: STUDENT IN AN ORGANIZED HEALTH CARE EDUCATION/TRAINING PROGRAM

## 2025-05-30 PROCEDURE — 99999 PR PBB SHADOW E&M-EST. PATIENT-LVL IV: CPT | Mod: PBBFAC,,, | Performed by: STUDENT IN AN ORGANIZED HEALTH CARE EDUCATION/TRAINING PROGRAM

## 2025-05-30 PROCEDURE — 3079F DIAST BP 80-89 MM HG: CPT | Mod: CPTII,S$GLB,, | Performed by: STUDENT IN AN ORGANIZED HEALTH CARE EDUCATION/TRAINING PROGRAM

## 2025-05-30 PROCEDURE — 1159F MED LIST DOCD IN RCRD: CPT | Mod: CPTII,S$GLB,, | Performed by: STUDENT IN AN ORGANIZED HEALTH CARE EDUCATION/TRAINING PROGRAM

## 2025-05-30 PROCEDURE — 99214 OFFICE O/P EST MOD 30 MIN: CPT | Mod: S$GLB,,, | Performed by: STUDENT IN AN ORGANIZED HEALTH CARE EDUCATION/TRAINING PROGRAM

## 2025-05-30 PROCEDURE — G2211 COMPLEX E/M VISIT ADD ON: HCPCS | Mod: S$GLB,,, | Performed by: STUDENT IN AN ORGANIZED HEALTH CARE EDUCATION/TRAINING PROGRAM

## 2025-05-30 PROCEDURE — 3075F SYST BP GE 130 - 139MM HG: CPT | Mod: CPTII,S$GLB,, | Performed by: STUDENT IN AN ORGANIZED HEALTH CARE EDUCATION/TRAINING PROGRAM

## 2025-05-30 RX ORDER — PANTOPRAZOLE SODIUM 20 MG/1
20 TABLET, DELAYED RELEASE ORAL DAILY
Qty: 90 TABLET | Refills: 3 | Status: SHIPPED | OUTPATIENT
Start: 2025-05-30 | End: 2026-05-30

## 2025-05-30 RX ORDER — OXYCODONE AND ACETAMINOPHEN 5; 325 MG/1; MG/1
1 TABLET ORAL
Qty: 7 TABLET | Refills: 0 | Status: SHIPPED | OUTPATIENT
Start: 2025-05-30

## 2025-06-11 PROCEDURE — 82043 UR ALBUMIN QUANTITATIVE: CPT | Performed by: INTERNAL MEDICINE

## 2025-07-07 DIAGNOSIS — E11.69 TYPE 2 DIABETES MELLITUS WITH OTHER SPECIFIED COMPLICATION, WITHOUT LONG-TERM CURRENT USE OF INSULIN: ICD-10-CM

## 2025-07-24 DIAGNOSIS — N52.9 ERECTILE DYSFUNCTION, UNSPECIFIED ERECTILE DYSFUNCTION TYPE: ICD-10-CM

## 2025-07-24 RX ORDER — TADALAFIL 20 MG/1
20 TABLET ORAL DAILY
Qty: 30 TABLET | Refills: 11 | Status: SHIPPED | OUTPATIENT
Start: 2025-07-24 | End: 2026-07-24

## 2025-07-24 NOTE — TELEPHONE ENCOUNTER
Copied from CRM #[id number]. Topic: Medications - Medication Refill  >> Jul 24, 2025 12:14 PM Misty wrote:  Type:  RX Refill Request    Who Called:  Pt   Refill or New Rx: refill   RX Name and Strength:tadalafiL (CIALIS) 20 MG Tab  Preferred Pharmacy with phone number:  [facility] - [place], [place] - [address] [facility] [address]  [address] [facility] [address] [facility] [address]  Phone: [phone] Fax: [phone]  Local or Mail Order:Local   Ordering Provider: Tanya   Would the patient rather a call back or a response via [facility]? Call   Best Call Back Number:[phone]   Additional Information:   [id number]